# Patient Record
Sex: MALE | Race: BLACK OR AFRICAN AMERICAN | NOT HISPANIC OR LATINO | Employment: FULL TIME | URBAN - METROPOLITAN AREA
[De-identification: names, ages, dates, MRNs, and addresses within clinical notes are randomized per-mention and may not be internally consistent; named-entity substitution may affect disease eponyms.]

---
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
## 2019-04-05 ENCOUNTER — APPOINTMENT (OUTPATIENT)
Dept: LAB | Facility: HOSPITAL | Age: 19
End: 2019-04-05
Attending: UROLOGY
Payer: COMMERCIAL

## 2019-04-05 ENCOUNTER — OFFICE VISIT (OUTPATIENT)
Dept: UROLOGY | Facility: CLINIC | Age: 19
End: 2019-04-05
Payer: COMMERCIAL

## 2019-04-05 VITALS
BODY MASS INDEX: 19.89 KG/M2 | DIASTOLIC BLOOD PRESSURE: 76 MMHG | HEIGHT: 67 IN | TEMPERATURE: 98 F | WEIGHT: 126.75 LBS | SYSTOLIC BLOOD PRESSURE: 115 MMHG | HEART RATE: 79 BPM | RESPIRATION RATE: 18 BRPM

## 2019-04-05 DIAGNOSIS — R30.0 DYSURIA: ICD-10-CM

## 2019-04-05 DIAGNOSIS — N34.2 URETHRITIS: ICD-10-CM

## 2019-04-05 DIAGNOSIS — N41.9 PROSTATITIS, UNSPECIFIED PROSTATITIS TYPE: ICD-10-CM

## 2019-04-05 DIAGNOSIS — R31.29 MICROSCOPIC HEMATURIA: Primary | ICD-10-CM

## 2019-04-05 LAB
BILIRUB SERPL-MCNC: ABNORMAL MG/DL
BLOOD URINE, POC: ABNORMAL
COLOR, POC UA: YELLOW
GLUCOSE UR QL STRIP: ABNORMAL
KETONES UR QL STRIP: ABNORMAL
LEUKOCYTE ESTERASE URINE, POC: ABNORMAL
NITRITE, POC UA: ABNORMAL
PH, POC UA: 6.5
PROTEIN, POC: 300
SPECIFIC GRAVITY, POC UA: 1.02
UROBILINOGEN, POC UA: ABNORMAL

## 2019-04-05 PROCEDURE — 99204 OFFICE O/P NEW MOD 45 MIN: CPT | Mod: 25,S$GLB,, | Performed by: UROLOGY

## 2019-04-05 PROCEDURE — 81002 POCT URINE DIPSTICK WITHOUT MICROSCOPE: ICD-10-PCS | Mod: S$GLB,,, | Performed by: UROLOGY

## 2019-04-05 PROCEDURE — 88112 CYTOPATH CELL ENHANCE TECH: CPT | Performed by: PATHOLOGY

## 2019-04-05 PROCEDURE — 87086 URINE CULTURE/COLONY COUNT: CPT

## 2019-04-05 PROCEDURE — 99204 PR OFFICE/OUTPT VISIT, NEW, LEVL IV, 45-59 MIN: ICD-10-PCS | Mod: 25,S$GLB,, | Performed by: UROLOGY

## 2019-04-05 PROCEDURE — 88112 CYTOPATH CELL ENHANCE TECH: CPT | Mod: 26,,, | Performed by: PATHOLOGY

## 2019-04-05 PROCEDURE — 99999 PR PBB SHADOW E&M-EST. PATIENT-LVL III: ICD-10-PCS | Mod: PBBFAC,,, | Performed by: UROLOGY

## 2019-04-05 PROCEDURE — 87088 URINE BACTERIA CULTURE: CPT

## 2019-04-05 PROCEDURE — 88112 CYTOLOGY SPECIMEN-URINE: ICD-10-PCS | Mod: 26,,, | Performed by: PATHOLOGY

## 2019-04-05 PROCEDURE — 99999 PR PBB SHADOW E&M-EST. PATIENT-LVL III: CPT | Mod: PBBFAC,,, | Performed by: UROLOGY

## 2019-04-05 PROCEDURE — 81002 URINALYSIS NONAUTO W/O SCOPE: CPT | Mod: S$GLB,,, | Performed by: UROLOGY

## 2019-04-05 RX ORDER — DOXYCYCLINE 100 MG/1
100 CAPSULE ORAL 2 TIMES DAILY
Qty: 20 CAPSULE | Refills: 0 | Status: SHIPPED | OUTPATIENT
Start: 2019-04-05 | End: 2019-06-27 | Stop reason: ALTCHOICE

## 2019-04-05 RX ORDER — PHENAZOPYRIDINE HYDROCHLORIDE 200 MG/1
200 TABLET, FILM COATED ORAL EVERY 6 HOURS PRN
Qty: 20 TABLET | Refills: 0 | Status: SHIPPED | OUTPATIENT
Start: 2019-04-05 | End: 2019-06-27 | Stop reason: ALTCHOICE

## 2019-04-05 NOTE — PROGRESS NOTES
OFFICE NOTE H AND P NEW PATIENT    AGE:  18.    DRUG ALLERGIES:  NKDA.    CHIEF COMPLAINT:  Dysuria, hematuria, painful erection, bloody drainage from the   penis.    HISTORY OF PRESENT ILLNESS:  This 18-year-old male presents with approximately   one-month history of experiencing intermittent episodes of the above-mentioned   symptoms, for which he is yet to undergo any evaluation or treatment.  He denies   any recent sexual encounters.  Denies any other  history.    PAST MEDICAL HISTORY:  Asthma, ADHD.    PAST SURGICAL HISTORY:  Tonsillectomy and tympanostomy.    PRESENT MEDICATIONS:  He is not on any medication at this point for any of the   above-mentioned conditions.    FAMILY HISTORY:  Negative.    SOCIAL HISTORY:  He cooks in a fast-food restaurant.  Single, no children.    Tobacco, none.  Alcohol, none.    REVIEW OF SYMPTOMS:  GENERAL:  No weight change.  Good appetite.  HEAD AND NECK:  No headaches.  No visual problems.  CARDIORESPIRATORY:  Denies chest pain, shortness of breath or cough.  GASTROINTESTINAL:  No trouble swallowing, constipation or diarrhea.  MUSCULOSKELETAL:  No joint or back problems.  HEMATOLOGIC:  No bleeding from any of the sites.    PHYSICAL EXAMINATION:  VITAL SIGNS:  BP of 115/76, pulse 79, temperature 98, respirations 18, weight   57.5 kg, height 5 feet 7 inches.  GENERAL:  Well-developed, well-nourished 18-year-old male, alert, oriented,   cooperative, in no acute distress.  HEAD AND NECK:  Throat clear.  No adenopathies.  CHEST:  Clear.  HEART:  Regular.  No murmur.  ABDOMEN:  Soft, benign and nontender.  No masses.  No hernias, no organomegaly.  EXTERNAL GENITAL:  Normal phallus with adequate meatus.  No evidence of any   blood at the meatus.  No skin lesions.  No Peyronie plaques.  Testes descended   and feel normal.  No scrotal masses.  RECTAL:  A 15 to 20 g smooth prostate.  No nodule.  Normal sphincter tone.    UA did reveal trace of blood, also moderate number of wbc's, pH  6.5.    FINAL IMPRESSION:  Low urinary tract symptoms consistent with possible   urethritis and/or prostatitis and urinary tract infection, microscopic   hematuria, pyuria.    RECOMMENDATIONS:  Urine for C and S and cytology.  Trial on doxycycline 100 mg   p.o. b.i.d. which the patient was given a prescription, Pyridium 200 mg q. 6   hours p.r.n. for the dysuria and we will contact him with urine test results and   notify him of his next appointment.      MD/TAJ  dd: 04/05/2019 13:46:22 (CDT)  td: 04/06/2019 00:54:25 (CDT)  Doc ID   #[id number]  Job ID #[id number]    CC:

## 2019-04-07 LAB — BACTERIA UR CULT: NORMAL

## 2019-06-27 ENCOUNTER — OFFICE VISIT (OUTPATIENT)
Dept: UROLOGY | Facility: CLINIC | Age: 19
End: 2019-06-27
Payer: COMMERCIAL

## 2019-06-27 VITALS
BODY MASS INDEX: 19.66 KG/M2 | HEIGHT: 67 IN | WEIGHT: 125.25 LBS | TEMPERATURE: 99 F | DIASTOLIC BLOOD PRESSURE: 80 MMHG | RESPIRATION RATE: 18 BRPM | SYSTOLIC BLOOD PRESSURE: 132 MMHG | HEART RATE: 63 BPM

## 2019-06-27 DIAGNOSIS — R31.29 MICROSCOPIC HEMATURIA: Primary | ICD-10-CM

## 2019-06-27 DIAGNOSIS — R30.0 DYSURIA: ICD-10-CM

## 2019-06-27 PROCEDURE — 99214 PR OFFICE/OUTPT VISIT, EST, LEVL IV, 30-39 MIN: ICD-10-PCS | Mod: 25,S$GLB,, | Performed by: UROLOGY

## 2019-06-27 PROCEDURE — 99999 PR PBB SHADOW E&M-EST. PATIENT-LVL IV: ICD-10-PCS | Mod: PBBFAC,,, | Performed by: UROLOGY

## 2019-06-27 PROCEDURE — 99214 OFFICE O/P EST MOD 30 MIN: CPT | Mod: 25,S$GLB,, | Performed by: UROLOGY

## 2019-06-27 PROCEDURE — 99999 PR PBB SHADOW E&M-EST. PATIENT-LVL IV: CPT | Mod: PBBFAC,,, | Performed by: UROLOGY

## 2019-06-27 PROCEDURE — 87086 URINE CULTURE/COLONY COUNT: CPT

## 2019-06-27 PROCEDURE — 81000 CHG URINALYSIS, NONAUTO, W/SCOPE: ICD-10-PCS | Mod: S$GLB,,, | Performed by: UROLOGY

## 2019-06-27 PROCEDURE — 81000 URINALYSIS NONAUTO W/SCOPE: CPT | Mod: S$GLB,,, | Performed by: UROLOGY

## 2019-06-27 RX ORDER — SULFAMETHOXAZOLE AND TRIMETHOPRIM 800; 160 MG/1; MG/1
1 TABLET ORAL 2 TIMES DAILY
Qty: 20 TABLET | Refills: 0 | Status: ON HOLD | OUTPATIENT
Start: 2019-06-27 | End: 2019-08-05 | Stop reason: ALTCHOICE

## 2019-06-27 NOTE — PROGRESS NOTES
OFFICE NOTE    CHIEF COMPLAINT:  Recurrent lower urinary tract symptoms with dysuria and UTI.    HISTORY OF PRESENT ILLNESS:  This 18-year-old male returns for routine recheck.    At his last visit on 04/05/2019, he had presented with approximately one-month   history of intermittent episodes of hematuria and dysuria and at that time, he   was placed on doxycycline 100 mg p.o. b.i.d., which resolved the problem, but   over the past week he says the dysuria and the symptoms have recurred again.  He   was found at the last visit to have urine infection growing Staph for which he   was treated with doxycycline.  Currently, he is not on any antibiotics and   denies any fever, chills, no any other complaints.    PHYSICAL EXAMINATION:  GENITAL:  Essentially unremarkable.    UA again revealed evidence of microscopic hematuria and pyuria with pH 5.5.    FINAL IMPRESSION:  Recurrent lower tract symptoms, recurrent microscopic   hematuria and pyuria.    RECOMMENDATIONS:  Urine for C and S and we will treat with appropriate   antibiotics and it also has been noted that urine cytology at his last visit was   negative.  We will treat with appropriate antibiotics and also plan for CT scan   of the abdomen and pelvis.  Possibility of cystoscopy may need to be considered   if the problem continues and recurs.      KARLA  dd: 06/27/2019 11:27:18 (CDT)  td: 06/28/2019 01:56:46 (CDT)  Doc ID   #[id number]  Job ID #[id number]    CC:

## 2019-06-29 LAB
BACTERIA UR CULT: NORMAL
BACTERIA UR CULT: NORMAL

## 2019-07-23 ENCOUNTER — PATIENT MESSAGE (OUTPATIENT)
Dept: UROLOGY | Facility: CLINIC | Age: 19
End: 2019-07-23

## 2019-07-26 ENCOUNTER — HOSPITAL ENCOUNTER (OUTPATIENT)
Dept: RADIOLOGY | Facility: HOSPITAL | Age: 19
Discharge: HOME | End: 2019-07-26
Attending: UROLOGY
Payer: COMMERCIAL

## 2019-07-26 DIAGNOSIS — R31.29 MICROSCOPIC HEMATURIA: ICD-10-CM

## 2019-07-26 PROCEDURE — 74178 CT ABDOMEN PELVIS W WO CONTRAST: ICD-10-PCS | Mod: 26,,, | Performed by: RADIOLOGY

## 2019-07-26 PROCEDURE — 74178 CT ABD&PLV WO CNTR FLWD CNTR: CPT | Mod: TC

## 2019-07-26 PROCEDURE — 74178 CT ABD&PLV WO CNTR FLWD CNTR: CPT | Mod: 26,,, | Performed by: RADIOLOGY

## 2019-07-26 PROCEDURE — 25500020 PHARM REV CODE 255: Performed by: UROLOGY

## 2019-07-26 RX ADMIN — IOHEXOL 125 ML: 350 INJECTION, SOLUTION INTRAVENOUS at 11:07

## 2019-07-28 ENCOUNTER — PATIENT MESSAGE (OUTPATIENT)
Dept: UROLOGY | Facility: CLINIC | Age: 19
End: 2019-07-28

## 2019-07-29 ENCOUNTER — TELEPHONE (OUTPATIENT)
Dept: UROLOGY | Facility: CLINIC | Age: 19
End: 2019-07-29

## 2019-07-29 ENCOUNTER — PATIENT MESSAGE (OUTPATIENT)
Dept: UROLOGY | Facility: CLINIC | Age: 19
End: 2019-07-29

## 2019-07-30 ENCOUNTER — PATIENT MESSAGE (OUTPATIENT)
Dept: UROLOGY | Facility: CLINIC | Age: 19
End: 2019-07-30

## 2019-07-31 DIAGNOSIS — R31.0 GROSS HEMATURIA: Primary | ICD-10-CM

## 2019-07-31 DIAGNOSIS — R31.9 HEMATURIA: ICD-10-CM

## 2019-07-31 DIAGNOSIS — R39.9 LOWER URINARY TRACT SYMPTOMS (LUTS): ICD-10-CM

## 2019-07-31 NOTE — H&P
Subjective:       Patient ID: Michele Perez is a 18 y.o. male.    Chief Complaint:  Recurrent hematuria and dysuria.  Urine culture cytology negative.  CT scan reveals  increased blood flow around the prostate otherwise no other significant findings.    Past Medical History:   Diagnosis Date    ADHD (attention deficit hyperactivity disorder)     no current medications    Allergy     seasonal rhinitis    Asthma     primarily exercise-induced asthma    Learning disability     speech related     Past Surgical History:   Procedure Laterality Date    ADENOIDECTOMY  2002    TONSILLECTOMY  2002    TYMPANOSTOMY TUBE PLACEMENT  2002     No family history on file.  Social History     Socioeconomic History    Marital status: Single     Spouse name: Not on file    Number of children: Not on file    Years of education: Not on file    Highest education level: Not on file   Occupational History    Not on file   Social Needs    Financial resource strain: Not on file    Food insecurity:     Worry: Not on file     Inability: Not on file    Transportation needs:     Medical: Not on file     Non-medical: Not on file   Tobacco Use    Smoking status: Never Smoker    Smokeless tobacco: Never Used   Substance and Sexual Activity    Alcohol use: No    Drug use: No    Sexual activity: Never   Lifestyle    Physical activity:     Days per week: Not on file     Minutes per session: Not on file    Stress: Not on file   Relationships    Social connections:     Talks on phone: Not on file     Gets together: Not on file     Attends Sabianism service: Not on file     Active member of club or organization: Not on file     Attends meetings of clubs or organizations: Not on file     Relationship status: Not on file   Other Topics Concern    Not on file   Social History Narrative    PMH:ADHD inattentive type, ODD and LD with speech impairment in 2005, not taking any medications now; T&A with PET in 2002; AR with  occasional sinusitis last in 2/10, rare asthma last attacks 12/09, 4/09 and 1/08, worse with sports    SH:intact family, 3 siblings, no smokers or pets    FH:RAD, AR, DM in [facility], MGGF had AMI death, MGM has HTN; PGM, cousins and PA have SCT, MGA had breast cancer    SCHOOL/ACTIVITIES:6th grade good student, active in basketball and baseball    LAB:Normal lipids in 2009 and UA in 2007; Hgb 16.4 in 8/11                   Current Outpatient Medications   Medication Sig Dispense Refill    sulfamethoxazole-trimethoprim 800-160mg (BACTRIM DS) 800-160 mg Tab Take 1 tablet by mouth 2 (two) times daily. 20 tablet 0     No current facility-administered medications for this visit.      Review of patient's allergies indicates:  No Known Allergies    Review of Systems   All other systems reviewed and are negative.      Objective:      There were no vitals filed for this visit.  Physical Exam   Cardiovascular: Normal rate.   Pulmonary/Chest: Effort normal.   Abdominal: Soft.   Genitourinary: Prostate normal and penis normal.       Assessment:       1. Gross hematuria    2. Lower urinary tract symptoms (LUTS)    3. Hematuria        Plan:       Cystoscopy

## 2019-07-31 NOTE — H&P (VIEW-ONLY)
Subjective:       Patient ID: Michele Perez is a 18 y.o. male.    Chief Complaint:  Recurrent hematuria and dysuria.  Urine culture cytology negative.  CT scan reveals  increased blood flow around the prostate otherwise no other significant findings.    Past Medical History:   Diagnosis Date    ADHD (attention deficit hyperactivity disorder)     no current medications    Allergy     seasonal rhinitis    Asthma     primarily exercise-induced asthma    Learning disability     speech related     Past Surgical History:   Procedure Laterality Date    ADENOIDECTOMY  2002    TONSILLECTOMY  2002    TYMPANOSTOMY TUBE PLACEMENT  2002     No family history on file.  Social History     Socioeconomic History    Marital status: Single     Spouse name: Not on file    Number of children: Not on file    Years of education: Not on file    Highest education level: Not on file   Occupational History    Not on file   Social Needs    Financial resource strain: Not on file    Food insecurity:     Worry: Not on file     Inability: Not on file    Transportation needs:     Medical: Not on file     Non-medical: Not on file   Tobacco Use    Smoking status: Never Smoker    Smokeless tobacco: Never Used   Substance and Sexual Activity    Alcohol use: No    Drug use: No    Sexual activity: Never   Lifestyle    Physical activity:     Days per week: Not on file     Minutes per session: Not on file    Stress: Not on file   Relationships    Social connections:     Talks on phone: Not on file     Gets together: Not on file     Attends Mosque service: Not on file     Active member of club or organization: Not on file     Attends meetings of clubs or organizations: Not on file     Relationship status: Not on file   Other Topics Concern    Not on file   Social History Narrative    PMH:ADHD inattentive type, ODD and LD with speech impairment in 2005, not taking any medications now; T&A with PET in 2002; AR with  occasional sinusitis last in 2/10, rare asthma last attacks 12/09, 4/09 and 1/08, worse with sports    SH:intact family, 3 siblings, no smokers or pets    FH:RAD, AR, DM in [facility], MGGF had AMI death, MGM has HTN; PGM, cousins and PA have SCT, MGA had breast cancer    SCHOOL/ACTIVITIES:6th grade good student, active in basketball and baseball    LAB:Normal lipids in 2009 and UA in 2007; Hgb 16.4 in 8/11                   Current Outpatient Medications   Medication Sig Dispense Refill    sulfamethoxazole-trimethoprim 800-160mg (BACTRIM DS) 800-160 mg Tab Take 1 tablet by mouth 2 (two) times daily. 20 tablet 0     No current facility-administered medications for this visit.      Review of patient's allergies indicates:  No Known Allergies    Review of Systems   All other systems reviewed and are negative.      Objective:      There were no vitals filed for this visit.  Physical Exam   Cardiovascular: Normal rate.   Pulmonary/Chest: Effort normal.   Abdominal: Soft.   Genitourinary: Prostate normal and penis normal.       Assessment:       1. Gross hematuria    2. Lower urinary tract symptoms (LUTS)    3. Hematuria        Plan:       Cystoscopy

## 2019-08-01 ENCOUNTER — HOSPITAL ENCOUNTER (OUTPATIENT)
Dept: PREADMISSION TESTING | Facility: HOSPITAL | Age: 19
Discharge: HOME | End: 2019-08-01
Attending: UROLOGY
Payer: COMMERCIAL

## 2019-08-01 VITALS — BODY MASS INDEX: 20.39 KG/M2 | WEIGHT: 129.94 LBS | HEIGHT: 67 IN

## 2019-08-01 PROCEDURE — 99900103 DSU ONLY-NO CHARGE-INITIAL HR (STAT)

## 2019-08-01 PROCEDURE — 99900104 DSU ONLY-NO CHARGE-EA ADD'L HR (STAT)

## 2019-08-01 NOTE — DISCHARGE INSTRUCTIONS

## 2019-08-03 ENCOUNTER — ANESTHESIA EVENT (OUTPATIENT)
Dept: SURGERY | Facility: HOSPITAL | Age: 19
End: 2019-08-03
Payer: COMMERCIAL

## 2019-08-05 ENCOUNTER — ANESTHESIA (OUTPATIENT)
Dept: SURGERY | Facility: HOSPITAL | Age: 19
End: 2019-08-05
Payer: COMMERCIAL

## 2019-08-05 ENCOUNTER — HOSPITAL ENCOUNTER (OUTPATIENT)
Facility: HOSPITAL | Age: 19
Discharge: HOME | End: 2019-08-05
Attending: UROLOGY | Admitting: UROLOGY
Payer: COMMERCIAL

## 2019-08-05 VITALS
WEIGHT: 129 LBS | OXYGEN SATURATION: 100 % | SYSTOLIC BLOOD PRESSURE: 130 MMHG | RESPIRATION RATE: 16 BRPM | BODY MASS INDEX: 20.25 KG/M2 | HEIGHT: 67 IN | DIASTOLIC BLOOD PRESSURE: 82 MMHG | HEART RATE: 77 BPM | TEMPERATURE: 98 F

## 2019-08-05 DIAGNOSIS — R31.9 HEMATURIA: ICD-10-CM

## 2019-08-05 DIAGNOSIS — R31.0 GROSS HEMATURIA: ICD-10-CM

## 2019-08-05 DIAGNOSIS — R39.9 LOWER URINARY TRACT SYMPTOMS (LUTS): ICD-10-CM

## 2019-08-05 PROCEDURE — 88342 IMHCHEM/IMCYTCHM 1ST ANTB: CPT | Mod: 26,,, | Performed by: PATHOLOGY

## 2019-08-05 PROCEDURE — 36000707: Performed by: UROLOGY

## 2019-08-05 PROCEDURE — 88305 TISSUE SPECIMEN TO PATHOLOGY - SURGERY: ICD-10-PCS | Mod: 26,,, | Performed by: PATHOLOGY

## 2019-08-05 PROCEDURE — 88305 TISSUE EXAM BY PATHOLOGIST: CPT | Mod: 26,,, | Performed by: PATHOLOGY

## 2019-08-05 PROCEDURE — D9220A PRA ANESTHESIA: ICD-10-PCS | Mod: CRNA,,, | Performed by: NURSE ANESTHETIST, CERTIFIED REGISTERED

## 2019-08-05 PROCEDURE — 52260 PR CYSTOSCOPY,DIL BLADDER,GEN ANESTH: ICD-10-PCS | Mod: ,,, | Performed by: UROLOGY

## 2019-08-05 PROCEDURE — 71000039 HC RECOVERY, EACH ADD'L HOUR: Performed by: UROLOGY

## 2019-08-05 PROCEDURE — 63600175 PHARM REV CODE 636 W HCPCS: Performed by: UROLOGY

## 2019-08-05 PROCEDURE — 25000003 PHARM REV CODE 250: Performed by: ANESTHESIOLOGY

## 2019-08-05 PROCEDURE — 71000033 HC RECOVERY, INTIAL HOUR: Performed by: UROLOGY

## 2019-08-05 PROCEDURE — 63600175 PHARM REV CODE 636 W HCPCS: Performed by: NURSE ANESTHETIST, CERTIFIED REGISTERED

## 2019-08-05 PROCEDURE — 37000008 HC ANESTHESIA 1ST 15 MINUTES: Performed by: UROLOGY

## 2019-08-05 PROCEDURE — 63600175 PHARM REV CODE 636 W HCPCS: Performed by: ANESTHESIOLOGY

## 2019-08-05 PROCEDURE — D9220A PRA ANESTHESIA: ICD-10-PCS | Mod: ANES,,, | Performed by: ANESTHESIOLOGY

## 2019-08-05 PROCEDURE — 37000009 HC ANESTHESIA EA ADD 15 MINS: Performed by: UROLOGY

## 2019-08-05 PROCEDURE — 99900103 DSU ONLY-NO CHARGE-INITIAL HR (STAT): Performed by: UROLOGY

## 2019-08-05 PROCEDURE — 99900104 DSU ONLY-NO CHARGE-EA ADD'L HR (STAT): Performed by: UROLOGY

## 2019-08-05 PROCEDURE — 71000015 HC POSTOP RECOV 1ST HR: Performed by: UROLOGY

## 2019-08-05 PROCEDURE — 88342 TISSUE SPECIMEN TO PATHOLOGY - SURGERY: ICD-10-PCS | Mod: 26,,, | Performed by: PATHOLOGY

## 2019-08-05 PROCEDURE — D9220A PRA ANESTHESIA: Mod: ANES,,, | Performed by: ANESTHESIOLOGY

## 2019-08-05 PROCEDURE — 36000706: Performed by: UROLOGY

## 2019-08-05 PROCEDURE — 52260 CYSTOSCOPY AND TREATMENT: CPT | Mod: ,,, | Performed by: UROLOGY

## 2019-08-05 PROCEDURE — 88305 TISSUE EXAM BY PATHOLOGIST: CPT | Performed by: PATHOLOGY

## 2019-08-05 PROCEDURE — D9220A PRA ANESTHESIA: Mod: CRNA,,, | Performed by: NURSE ANESTHETIST, CERTIFIED REGISTERED

## 2019-08-05 PROCEDURE — 25000003 PHARM REV CODE 250: Performed by: UROLOGY

## 2019-08-05 RX ORDER — PHENAZOPYRIDINE HYDROCHLORIDE 100 MG/1
200 TABLET, FILM COATED ORAL ONCE
Status: COMPLETED | OUTPATIENT
Start: 2019-08-05 | End: 2019-08-05

## 2019-08-05 RX ORDER — LIDOCAINE HYDROCHLORIDE 10 MG/ML
1 INJECTION, SOLUTION EPIDURAL; INFILTRATION; INTRACAUDAL; PERINEURAL ONCE
Status: DISPENSED | OUTPATIENT
Start: 2019-08-05

## 2019-08-05 RX ORDER — ONDANSETRON HYDROCHLORIDE 2 MG/ML
INJECTION, SOLUTION INTRAMUSCULAR; INTRAVENOUS
Status: DISCONTINUED | OUTPATIENT
Start: 2019-08-05 | End: 2019-08-05

## 2019-08-05 RX ORDER — MIDAZOLAM HYDROCHLORIDE 1 MG/ML
INJECTION INTRAMUSCULAR; INTRAVENOUS
Status: DISCONTINUED | OUTPATIENT
Start: 2019-08-05 | End: 2019-08-05

## 2019-08-05 RX ORDER — HYDROCODONE BITARTRATE AND ACETAMINOPHEN 5; 325 MG/1; MG/1
1 TABLET ORAL EVERY 4 HOURS PRN
Status: DISCONTINUED | OUTPATIENT
Start: 2019-08-05 | End: 2019-08-05 | Stop reason: HOSPADM

## 2019-08-05 RX ORDER — SODIUM CHLORIDE, SODIUM LACTATE, POTASSIUM CHLORIDE, CALCIUM CHLORIDE 600; 310; 30; 20 MG/100ML; MG/100ML; MG/100ML; MG/100ML
INJECTION, SOLUTION INTRAVENOUS CONTINUOUS
Status: ACTIVE | OUTPATIENT
Start: 2019-08-05

## 2019-08-05 RX ORDER — FENTANYL CITRATE 50 UG/ML
INJECTION, SOLUTION INTRAMUSCULAR; INTRAVENOUS
Status: DISCONTINUED | OUTPATIENT
Start: 2019-08-05 | End: 2019-08-05

## 2019-08-05 RX ORDER — LIDOCAINE HYDROCHLORIDE 20 MG/ML
JELLY TOPICAL
Status: DISCONTINUED | OUTPATIENT
Start: 2019-08-05 | End: 2019-08-05 | Stop reason: HOSPADM

## 2019-08-05 RX ORDER — OXYCODONE HYDROCHLORIDE 5 MG/1
5 TABLET ORAL
Status: DISCONTINUED | OUTPATIENT
Start: 2019-08-05 | End: 2019-08-05 | Stop reason: HOSPADM

## 2019-08-05 RX ORDER — CEFAZOLIN SODIUM 2 G/50ML
2 SOLUTION INTRAVENOUS
Status: COMPLETED | OUTPATIENT
Start: 2019-08-05 | End: 2019-08-05

## 2019-08-05 RX ORDER — PROPOFOL 10 MG/ML
VIAL (ML) INTRAVENOUS
Status: DISCONTINUED | OUTPATIENT
Start: 2019-08-05 | End: 2019-08-05

## 2019-08-05 RX ORDER — LIDOCAINE HCL/PF 100 MG/5ML
SYRINGE (ML) INTRAVENOUS
Status: DISCONTINUED | OUTPATIENT
Start: 2019-08-05 | End: 2019-08-05

## 2019-08-05 RX ORDER — HYDROCODONE BITARTRATE AND ACETAMINOPHEN 5; 325 MG/1; MG/1
1 TABLET ORAL
Qty: 20 TABLET | Refills: 0 | Status: SHIPPED | OUTPATIENT
Start: 2019-08-05 | End: 2022-10-24

## 2019-08-05 RX ORDER — PHENAZOPYRIDINE HYDROCHLORIDE 100 MG/1
200 TABLET, FILM COATED ORAL
Qty: 20 TABLET | Refills: 0 | Status: SHIPPED | OUTPATIENT
Start: 2019-08-05 | End: 2022-10-24

## 2019-08-05 RX ORDER — SULFAMETHOXAZOLE AND TRIMETHOPRIM 800; 160 MG/1; MG/1
1 TABLET ORAL 2 TIMES DAILY
Qty: 20 TABLET | Refills: 0 | Status: SHIPPED | OUTPATIENT
Start: 2019-08-05 | End: 2019-08-15

## 2019-08-05 RX ORDER — HYDROMORPHONE HYDROCHLORIDE 2 MG/ML
0.2 INJECTION, SOLUTION INTRAMUSCULAR; INTRAVENOUS; SUBCUTANEOUS EVERY 5 MIN PRN
Status: DISCONTINUED | OUTPATIENT
Start: 2019-08-05 | End: 2019-08-05 | Stop reason: HOSPADM

## 2019-08-05 RX ORDER — FENTANYL CITRATE 50 UG/ML
25 INJECTION, SOLUTION INTRAMUSCULAR; INTRAVENOUS EVERY 5 MIN PRN
Status: DISCONTINUED | OUTPATIENT
Start: 2019-08-05 | End: 2019-08-05 | Stop reason: HOSPADM

## 2019-08-05 RX ORDER — DEXAMETHASONE SODIUM PHOSPHATE 4 MG/ML
INJECTION, SOLUTION INTRA-ARTICULAR; INTRALESIONAL; INTRAMUSCULAR; INTRAVENOUS; SOFT TISSUE
Status: DISCONTINUED | OUTPATIENT
Start: 2019-08-05 | End: 2019-08-05

## 2019-08-05 RX ADMIN — OXYCODONE HYDROCHLORIDE 5 MG: 5 TABLET ORAL at 03:08

## 2019-08-05 RX ADMIN — DEXAMETHASONE SODIUM PHOSPHATE 4 MG: 4 INJECTION, SOLUTION INTRAMUSCULAR; INTRAVENOUS at 02:08

## 2019-08-05 RX ADMIN — CEFAZOLIN SODIUM 2 G: 2 SOLUTION INTRAVENOUS at 02:08

## 2019-08-05 RX ADMIN — FENTANYL CITRATE 50 MCG: 50 INJECTION, SOLUTION INTRAMUSCULAR; INTRAVENOUS at 02:08

## 2019-08-05 RX ADMIN — SODIUM CHLORIDE, SODIUM LACTATE, POTASSIUM CHLORIDE, AND CALCIUM CHLORIDE: .6; .31; .03; .02 INJECTION, SOLUTION INTRAVENOUS at 02:08

## 2019-08-05 RX ADMIN — PHENAZOPYRIDINE HYDROCHLORIDE 200 MG: 100 TABLET ORAL at 03:08

## 2019-08-05 RX ADMIN — MIDAZOLAM HYDROCHLORIDE 2 MG: 1 INJECTION, SOLUTION INTRAMUSCULAR; INTRAVENOUS at 02:08

## 2019-08-05 RX ADMIN — ONDANSETRON 4 MG: 2 INJECTION, SOLUTION INTRAMUSCULAR; INTRAVENOUS at 02:08

## 2019-08-05 RX ADMIN — SODIUM CHLORIDE, SODIUM LACTATE, POTASSIUM CHLORIDE, AND CALCIUM CHLORIDE: .6; .31; .03; .02 INJECTION, SOLUTION INTRAVENOUS at 12:08

## 2019-08-05 RX ADMIN — PROPOFOL 200 MG: 10 INJECTION, EMULSION INTRAVENOUS at 02:08

## 2019-08-05 RX ADMIN — FENTANYL CITRATE 25 MCG: 50 INJECTION, SOLUTION INTRAMUSCULAR; INTRAVENOUS at 02:08

## 2019-08-05 RX ADMIN — LIDOCAINE HYDROCHLORIDE 75 MG: 20 INJECTION, SOLUTION INTRAVENOUS at 02:08

## 2019-08-05 NOTE — INTERVAL H&P NOTE
The patient has been examined and the H&P has been reviewed:    I concur with the findings and no changes have occurred since H&P was written.    Anesthesia/Surgery risks, benefits and alternative options discussed and understood by patient/family.          Active Hospital Problems    Diagnosis  POA    Hematuria [R31.9]  Yes      Resolved Hospital Problems   No resolved problems to display.

## 2019-08-05 NOTE — TRANSFER OF CARE
"Anesthesia Transfer of Care Note    Patient: Michele Perez    Procedure(s) Performed: Procedure(s) (LRB):  CYSTOSCOPY (N/A)  HYDRODISTENTION (N/A)  CYSTOSCOPY, WITH BLADDER BIOPSY, WITH FULGURATION IF INDICATED (N/A)    Patient location: PACU    Anesthesia Type: general    Transport from OR: Transported from OR on 2-3 L/min O2 by NC with adequate spontaneous ventilation    Post pain: adequate analgesia    Post assessment: no apparent anesthetic complications    Post vital signs: stable    Level of consciousness: sedated    Nausea/Vomiting: no nausea/vomiting    Complications: none    Transfer of care protocol was followed      Last vitals:   Visit Vitals  /61 (BP Location: Left arm, Patient Position: Sitting)   Pulse 73   Temp 37.2 °C (99 °F) (Skin)   Resp 18   Ht 5' 7" (1.702 m)   Wt 58.5 kg (129 lb)   SpO2 99%   BMI 20.20 kg/m²     "

## 2019-08-05 NOTE — ANESTHESIA PREPROCEDURE EVALUATION
08/05/2019  Michele Perez is a 18 y.o., male.    Anesthesia Evaluation    I have reviewed the Patient Summary Reports.    I have reviewed the Nursing Notes.   I have reviewed the Medications.     Review of Systems  Renal/:  Renal Symptoms/Infections/Stones: hematuria.      Gross hematuria (R31.0)      Lower urinary tract symptoms (LUTS) (R39.9)       Physical Exam  General:  Well nourished    Airway/Jaw/Neck:  Airway Findings: Mouth Opening: Normal Tongue: Normal  General Airway Assessment: Adult, Good  Mallampati: II  Improves to II with phonation.  TM Distance: 4-6 cm      Dental:  Dental Findings: In tact   Chest/Lungs:  Chest/Lungs Findings: Clear to auscultation, Normal Respiratory Rate     Heart/Vascular:  Heart Findings: Rate: Normal  Rhythm: Regular Rhythm  Sounds: Normal  Heart murmur: negative       Mental Status:  Mental Status Findings:  Cooperative, Alert and Oriented         Anesthesia Plan  Type of Anesthesia, risks & benefits discussed:  Anesthesia Type:  general  Patient's Preference:   Intra-op Monitoring Plan: standard ASA monitors  Intra-op Monitoring Plan Comments:   Post Op Pain Control Plan:   Post Op Pain Control Plan Comments:   Induction:   IV  Beta Blocker:  Patient is not currently on a Beta-Blocker (No further documentation required).       Informed Consent: Patient understands risks and agrees with Anesthesia plan.  Questions answered. Anesthesia consent signed with patient.  ASA Score: 1     Day of Surgery Review of History & Physical: I have interviewed and examined the patient. I have reviewed the patient's H&P dated:  There are no significant changes.  H&P update referred to the surgeon.         Ready For Surgery From Anesthesia Perspective.

## 2019-08-05 NOTE — OP NOTE
DATE OF PROCEDURE:  08/05/2019.    PREOPERATIVE DIAGNOSES:  Recurrent urinary tract infection and lower urinary   tract symptoms.    POSTOPERATIVE DIAGNOSES:  Recurrent urinary tract infection and lower urinary   tract symptoms with interstitial cystitis, pathology pending and hemorrhagic   bladder mucosa.    PROCEDURES PERFORMED:  Cystourethroscopy, bladder hydrodistention, biopsy and   fulguration of bladder lesions.    SURGEON:  Earl Gustafson M.D.    ANESTHESIA:  General.    PROCEDURE IN DETAIL:  The patient was brought to Cystoscopy Suite and after   adequate induction of general anesthesia, he was placed in lithotomy position.    Genitalia were prepped and draped in usual manner.  A 22 Irish cystoscope   sheath with a foroblique lens video camera was inserted under direct vision into   urethra.  Examination of the anterior urethra was unremarkable.  The instrument   was advanced towards the prostatic urethra where the verumontanum was   encountered and the prostatic urethra was wide open with no evidence of any   obstruction, no lesions.  The interior of the bladder was then examined.  The   trigone was symmetrically configurated.  Both orifices were slit-like, had clear   efflux.  Bladder mucosa was essentially smooth throughout, no evidence of   trabeculation or diverticula, no cellules, no lesions, no hemorrhagic sites, but   bladder was then distended and able to tolerate initially capacity of   approximately 500-600 mL.  It was then drained and reexamined and there were   multiple petechial hemorrhagic glomerulizations scattered throughout the bladder   mucosa that appeared to be consistent with interstitial cystitis and   hemorrhagic cystitis.  Cold cup biopsies were obtained from representative areas   on each side.  A repeat bladder hydrodistention was carried out just before   this and then an additional 700 mL up to 738 mL introduced into the bladder for   further bladder hydrodistention and then  the biopsies were taken as described   above.  Biopsy sites were then fulgurated with Bugbee electrode.  Bladder was   then drained, instruments removed.  Digital rectal examination reveals normal   smooth prostate and no evidence of any masses on bimanual examination.  The   patient having tolerated the procedure well, was then transferred to the   Recovery Room in stable condition.        KARLA  dd: 08/05/2019 15:06:42 (CDT)  td: 08/05/2019 19:24:54 (CYNTHIA)  Doc ID   #[id number]  Job ID #[id number]    CC:

## 2019-08-05 NOTE — DISCHARGE INSTRUCTIONS
General Information:    1.  Do not drink alcoholic beverages including beer for 24 hours or as long as you are on pain medication..  2.  Do not drive a motor vehicle, operate machinery or power tools, or signs legal papers for 24 hours or as long as you are on pain medication.   3.  You may experience light-headedness, dizziness, and sleepiness following surgery. Please do not stay alone. A responsible adult should be with you for this 24 hour period.  4.  Go home and rest.    5. Progress slowly to a normal diet unless instructed.  Otherwise, begin with liquids such as soft drinks, then soup and crackers working up to solid foods. Drink plenty of nonalcoholic fluids.  6.  Certain anesthetics and pain medications produce nausea and vomiting in certain       individuals. If nausea becomes a problem at home, call you doctor.    7. A nurse will be calling you sometime after surgery. Do not be alarmed. This is our way of finding out how you are doing.    8. Several times every hour while you are awake, take 2-3 deep breaths and cough. If you had stomach surgery hold a pillow or rolled towel firmly against your stomach before you cough. This will help with any pain the cough might cause.  9. Several times every hour while you are awake, pump and flex your feet 5-6 times and do foot circles. This will help prevent blood clots.    10.Call your doctor for severe pain, bleeding, fever, or signs or symptoms of infection (pain, swelling, redness, foul odor, drainage).      Discharge Instructions: After Your Surgery/Procedure  Youve just had surgery. During surgery you were given medicine called anesthesia to keep you relaxed and free of pain. After surgery you may have some pain or nausea. This is common. Here are some tips for feeling better and getting well after surgery.     Stay on schedule with your medication.   Going home  Your doctor or nurse will show you how to take care of yourself when you go home. He or she will  "also answer your questions. Have an adult family member or friend drive you home.      For your safety we recommend these precaution for the first 24 hours after your procedure:  · Do not drive or use heavy equipment.  · Do not make important decisions or sign legal papers.  · Do not drink alcohol.  · Have someone stay with you, if needed. He or she can watch for problems and help keep you safe.  · Your concentration, balance, coordination, and judgement may be impaired for many hours after anesthesia.  Use caution when ambulating or standing up.     · You may feel weak and "washed out" after anesthesia and surgery.      Subtle residual effects of general anesthesia or sedation with regional / local anesthesia can last more than 24 hours.  Rest for the remainder of the day or longer if your Doctor/Surgeon has advised you to do so.  Although you may feel normal within the first 24 hours, your reflexes and mental ability may be impaired without you realizing it.  You may feel dizzy, lightheaded or sleepy for 24 hours or longer.      Be sure to go to all follow-up visits with your doctor. And rest after your surgery for as long as your doctor tells you to.  Coping with pain  If you have pain after surgery, pain medicine will help you feel better. Take it as told, before pain becomes severe. Also, ask your doctor or pharmacist about other ways to control pain. This might be with heat, ice, or relaxation. And follow any other instructions your surgeon or nurse gives you.  Tips for taking pain medicine  To get the best relief possible, remember these points:  · Pain medicines can upset your stomach. Taking them with a little food may help.  · Most pain relievers taken by mouth need at least 20 to 30 minutes to start to work.  · Taking medicine on a schedule can help you remember to take it. Try to time your medicine so that you can take it before starting an activity. This might be before you get dressed, go for a walk, " or sit down for dinner.  · Constipation is a common side effect of pain medicines. Call your doctor before taking any medicines such as laxatives or stool softeners to help ease constipation. Also ask if you should skip any foods. Drinking lots of fluids and eating foods such as fruits and vegetables that are high in fiber can also help. Remember, do not take laxatives unless your surgeon has prescribed them.  · Drinking alcohol and taking pain medicine can cause dizziness and slow your breathing. It can even be deadly. Do not drink alcohol while taking pain medicine.  · Pain medicine can make you react more slowly to things. Do not drive or run machinery while taking pain medicine.  Your health care provider may tell you to take acetaminophen to help ease your pain. Ask him or her how much you are supposed to take each day. Acetaminophen or other pain relievers may interact with your prescription medicines or other over-the-counter (OTC) drugs. Some prescription medicines have acetaminophen and other ingredients. Using both prescription and OTC acetaminophen for pain can cause you to overdose. Read the labels on your OTC medicines with care. This will help you to clearly know the list of ingredients, how much to take, and any warnings. It may also help you not take too much acetaminophen. If you have questions or do not understand the information, ask your pharmacist or health care provider to explain it to you before you take the OTC medicine.  Managing nausea  Some people have an upset stomach after surgery. This is often because of anesthesia, pain, or pain medicine, or the stress of surgery. These tips will help you handle nausea and eat healthy foods as you get better. If you were on a special food plan before surgery, ask your doctor if you should follow it while you get better. These tips may help:  · Do not push yourself to eat. Your body will tell you when to eat and how much.  · Start off with clear  liquids and soup. They are easier to digest.  · Next try semi-solid foods, such as mashed potatoes, applesauce, and gelatin, as you feel ready.  · Slowly move to solid foods. Dont eat fatty, rich, or spicy foods at first.  · Do not force yourself to have 3 large meals a day. Instead eat smaller amounts more often.  · Take pain medicines with a small amount of solid food, such as crackers or toast, to avoid nausea.     Call your surgeon if  · You still have pain an hour after taking medicine. The medicine may not be strong enough.  · You feel too sleepy, dizzy, or groggy. The medicine may be too strong.  · You have side effects like nausea, vomiting, or skin changes, such as rash, itching, or hives.       If you have obstructive sleep apnea  You were given anesthesia medicine during surgery to keep you comfortable and free of pain. After surgery, you may have more apnea spells because of this medicine and other medicines you were given. The spells may last longer than usual.   At home:  · Keep using the continuous positive airway pressure (CPAP) device when you sleep. Unless your health care provider tells you not to, use it when you sleep, day or night. CPAP is a common device used to treat obstructive sleep apnea.  · Talk with your provider before taking any pain medicine, muscle relaxants, or sedatives. Your provider will tell you about the possible dangers of taking these medicines.  © 1695-3319 [employer]. [address] [place] [id number]. All rights reserved. This information is not intended as a substitute for professional medical care. Always follow your healthcare professional's instructions.      Post op instructions for prevention of DVT  What is deep vein thrombosis?  Deep vein thrombosis (DVT) is the medical term for blood clots in the deep veins of the leg.  These blood clots can be dangerous.  A DVT can block a blood vessel and keep blood from getting where it needs to go.   Another problem is that the clot can travel to other parts of the body such as the lungs.  A clot that travels to the lungs is called a pulmonary embolus (PE) and can cause serious problems with breathing which can lead to death.  Am I at risk for DVT/PE?  If you are not very active, you are at risk of DVT.  Anyone confined to bed, sitting for long periods of time, recovering from surgery, etc. increases the risk of DVT.  Other risk factors are cancer diagnosis, certain medications, estrogen replacement in any form,older age, obesity, pregnancy, smoking, history of clotting disorders, and dehydration.  How will I know if I have a DVT?   Swelling in the lower leg   Pain   Warmth, redness, hardness or bulging of the vein  If you have any of these symptoms, call your doctors office right away.  Some people will not have any symptoms until the clot moves to the lungs.  What are the symptoms of a PE?   Panting, shortness of breath, or trouble breathing   Sharp, knife-like chest pain when you breathe   Coughing or coughing up blood   Rapid heartbeat  If you have any of these symptoms or get worse quickly, call 911 for emergency treatment.  How can I prevent a DVT?   Avoid long periods of inactivity and dont cross your legs--get up and walk around every hour or so.   Stay active--walking after surgery is highly encouraged.  This means you should get out of the house and walk in the neighborhood.  Going up and down stairs will not impair healing (unless advised against such activity by your doctor).     Drink plenty of noncaffeinated, nonalcoholic fluids each day to prevent dehydration.   Wear special support stockings, if they have been advised by your doctor.   If you travel, stop at least once an hour and walk around.   Avoid smoking (assistance with stopping is available through your healthcare provider)  Always notify your doctor if you are not able to follow the post operative instructions that are  given to you at the time of discharge.  It may be necessary to prescribe one of the medications available to prevent DVT.      We hope your stay was comfortable as you heal now, mend and rest.    For we have enjoyed taking care of you by giving your our best.    And as you get better, by regaining your health and strength;   We count it as a privilege to have served you and hope your time at [facility] was well spent.      Thank  You!!!

## 2019-08-06 NOTE — PLAN OF CARE
Post op called made, numbers in chart are parents numbers. Father could not give me a number to call pt, states he will check on him and give a call if son needs anything.

## 2022-10-24 ENCOUNTER — OFFICE VISIT (OUTPATIENT)
Dept: UROLOGY | Facility: CLINIC | Age: 22
End: 2022-10-24
Payer: COMMERCIAL

## 2022-10-24 VITALS
DIASTOLIC BLOOD PRESSURE: 77 MMHG | HEART RATE: 69 BPM | SYSTOLIC BLOOD PRESSURE: 114 MMHG | WEIGHT: 125.69 LBS | BODY MASS INDEX: 19.73 KG/M2 | HEIGHT: 67 IN

## 2022-10-24 DIAGNOSIS — N39.0 URINARY TRACT INFECTION WITHOUT HEMATURIA, SITE UNSPECIFIED: Primary | ICD-10-CM

## 2022-10-24 LAB
BILIRUBIN, UA POC OHS: NEGATIVE
BLOOD, UA POC OHS: NEGATIVE
CLARITY, UA POC OHS: CLEAR
COLOR, UA POC OHS: YELLOW
GLUCOSE, UA POC OHS: NEGATIVE
KETONES, UA POC OHS: NEGATIVE
LEUKOCYTES, UA POC OHS: ABNORMAL
NITRITE, UA POC OHS: NEGATIVE
PH, UA POC OHS: 7.5
PROTEIN, UA POC OHS: NEGATIVE
SPECIFIC GRAVITY, UA POC OHS: 1.02
UROBILINOGEN, UA POC OHS: 1

## 2022-10-24 PROCEDURE — 99204 OFFICE O/P NEW MOD 45 MIN: CPT | Mod: S$GLB,,, | Performed by: NURSE PRACTITIONER

## 2022-10-24 PROCEDURE — 99204 PR OFFICE/OUTPT VISIT, NEW, LEVL IV, 45-59 MIN: ICD-10-PCS | Mod: S$GLB,,, | Performed by: NURSE PRACTITIONER

## 2022-10-24 PROCEDURE — 81003 URINALYSIS AUTO W/O SCOPE: CPT | Mod: S$GLB,,, | Performed by: NURSE PRACTITIONER

## 2022-10-24 PROCEDURE — 81003 POCT URINALYSIS(INSTRUMENT): ICD-10-PCS | Mod: S$GLB,,, | Performed by: NURSE PRACTITIONER

## 2022-10-24 PROCEDURE — 99999 PR PBB SHADOW E&M-NEW PATIENT-LVL III: ICD-10-PCS | Mod: PBBFAC,,, | Performed by: NURSE PRACTITIONER

## 2022-10-24 PROCEDURE — 99999 PR PBB SHADOW E&M-NEW PATIENT-LVL III: CPT | Mod: PBBFAC,,, | Performed by: NURSE PRACTITIONER

## 2022-10-24 PROCEDURE — 87086 URINE CULTURE/COLONY COUNT: CPT | Performed by: NURSE PRACTITIONER

## 2022-10-24 RX ORDER — DOXYCYCLINE 100 MG/1
100 CAPSULE ORAL 2 TIMES DAILY
Qty: 42 CAPSULE | Refills: 0 | Status: SHIPPED | OUTPATIENT
Start: 2022-10-24 | End: 2022-11-14

## 2022-10-24 NOTE — PROGRESS NOTES
[facility] Note  Staff: RAMIRO Loera    PCP: N/A    Chief Complaint: UTI symptoms, Dysuria    Subjective:        HPI: Michele Perez is a 21 y.o. male presents today in office with c/o dysuria which began over five days ago with no improvement in symptoms at this time.    The pt was last evaluated by Dr. Gustafson in 2019 for hx of recurrent UTIs and dysuria symptoms.    Several years ago, pt presented with approximately one-month   history of intermittent episodes of hematuria and dysuria and at that time, he   was placed on doxycycline 100 mg p.o. b.i.d., which resolved the problem, but   The symptoms then returned.   Currently, he is not on any antibiotics and   denies any fever, chills, no any other complaints.  However pt has been taking OTC AZO for his current LUTS.    Family Hx of  Cancers?:  Unknown  Current Tobacco use?:  None    Pt denies gross hematuria    UA in office today showed trace leukocytes, 7.5 ph, sg of 1.025    REVIEW OF SYSTEMS:  A comprehensive 10 system review was performed and is negative except as noted above in HPI    PMHx:  Past Medical History:   Diagnosis Date    ADHD (attention deficit hyperactivity disorder)     no current medications    Allergy     seasonal rhinitis    Asthma     primarily exercise-induced asthma    Learning disability     speech related     PSHx:  Past Surgical History:   Procedure Laterality Date    ADENOIDECTOMY  2002    CYSTOSCOPY N/A 8/5/2019    Procedure: CYSTOSCOPY;  Surgeon: Earl Gustafson MD;  Location: [facility] OR;  Service: Urology;  Laterality: N/A;    CYSTOSCOPY WITH BIOPSY OF BLADDER N/A 8/5/2019    Procedure: CYSTOSCOPY, WITH BLADDER BIOPSY, WITH FULGURATION IF INDICATED;  Surgeon: Earl Gustafson MD;  Location: [facility] OR;  Service: Urology;  Laterality: N/A;    TONSILLECTOMY  2002    TYMPANOSTOMY TUBE PLACEMENT  2002     Allergies:  Patient has no known allergies.    Medications: reviewed   Objective:      Vitals:    10/24/22 1310   BP: 114/77   Pulse: 69     General:WDWN in NAD  Eyes: PERRLA, normal conjunctiva  Respiratory: no increased work on breathing, clear to auscultation  Cardiovascular: regular rate and rhythm. No obvious extremity edema.  GI: palpation of masses. No tenderness. No hepatosplenomegaly to palpation.  Musculoskeletal: normal range of motion of bilateral upper extremities. Normal muscle strength and tone.  Skin: no obvious rashes or lesions. No tightening of skin noted.  Neurologic: CN grossly normal. Normal sensation.   Psychiatric: awake, alert and oriented x 3. Mood and affect normal. Cooperative.    Assessment:       1. Urinary tract infection without hematuria, site unspecified            Plan:   Dysuria, UTI symptoms:    Urine culture to be processed at this time.  In the meantime, Doxcycline 100 mg BID x 21 days prescribed to this pt during ov today.  The med prescribed to pt today as trial to see if med improves pt's current LUTS.  Benefits, risks and side affects were thoroughly explained to pt today in office with all questions answered.    F/UP-Once we receive urine results back, then we will contact this pt for f/up visit in the near future.  Pt verbalized understanding at this time.    Clarita Durán, RAMIRO

## 2022-10-26 LAB — BACTERIA UR CULT: NO GROWTH

## 2022-11-30 ENCOUNTER — OFFICE VISIT (OUTPATIENT)
Dept: UROLOGY | Facility: CLINIC | Age: 22
End: 2022-11-30
Payer: COMMERCIAL

## 2022-11-30 VITALS
HEIGHT: 67 IN | BODY MASS INDEX: 19.73 KG/M2 | HEART RATE: 79 BPM | WEIGHT: 125.69 LBS | SYSTOLIC BLOOD PRESSURE: 120 MMHG | DIASTOLIC BLOOD PRESSURE: 77 MMHG

## 2022-11-30 DIAGNOSIS — N41.9 PROSTATITIS, UNSPECIFIED PROSTATITIS TYPE: Primary | ICD-10-CM

## 2022-11-30 DIAGNOSIS — R86.8 DISCOLORED SEMEN: ICD-10-CM

## 2022-11-30 PROCEDURE — 99999 PR PBB SHADOW E&M-EST. PATIENT-LVL III: ICD-10-PCS | Mod: PBBFAC,,, | Performed by: NURSE PRACTITIONER

## 2022-11-30 PROCEDURE — 99213 PR OFFICE/OUTPT VISIT, EST, LEVL III, 20-29 MIN: ICD-10-PCS | Mod: S$GLB,,, | Performed by: NURSE PRACTITIONER

## 2022-11-30 PROCEDURE — 99999 PR PBB SHADOW E&M-EST. PATIENT-LVL III: CPT | Mod: PBBFAC,,, | Performed by: NURSE PRACTITIONER

## 2022-11-30 PROCEDURE — 99213 OFFICE O/P EST LOW 20 MIN: CPT | Mod: S$GLB,,, | Performed by: NURSE PRACTITIONER

## 2022-11-30 RX ORDER — DOXYCYCLINE HYCLATE 100 MG
100 TABLET ORAL 2 TIMES DAILY
Qty: 60 TABLET | Refills: 0 | Status: SHIPPED | OUTPATIENT
Start: 2022-11-30 | End: 2022-12-07

## 2022-11-30 NOTE — PROGRESS NOTES
[facility] Note  Staff: RAMIRO Loera    PCP: None    Chief Complaint: Discolored semen    Subjective:        HPI: Michele Perez is a 21 y.o. male presents with   Pt was last evaluated by me on 10/24/22 for UTI symptoms and Dysuria.  At that time he was treated for possible prostatitis.  Today, pt RTC c/o symptoms have returned, less ejaculation of semen observed with discoloration at this time.  Pt does admit during ov today that he continued having intercourse while on antibiotics after last ov.     Exam:  The pt was last evaluated by Dr. Gustafson in 2019 for hx of recurrent UTIs and dysuria symptoms.     Several years ago, pt presented with approximately one-month   history of intermittent episodes of hematuria and dysuria and at that time, he   was placed on doxycycline 100 mg p.o. b.i.d., which resolved the problem, but   The symptoms then returned.   Currently, he is not on any antibiotics and   denies any fever, chills, no any other complaints.  However pt has been taking OTC AZO for his current LUTS.     Family Hx of  Cancers?:  Unknown  Current Tobacco use?:  None     Pt denies gross hematuria       REVIEW OF SYSTEMS:  A comprehensive 10 system review was performed and is negative except as noted above in HPI    PMHx:  Past Medical History:   Diagnosis Date    ADHD (attention deficit hyperactivity disorder)     no current medications    Allergy     seasonal rhinitis    Asthma     primarily exercise-induced asthma    Learning disability     speech related     PSHx:  Past Surgical History:   Procedure Laterality Date    ADENOIDECTOMY  2002    CYSTOSCOPY N/A 8/5/2019    Procedure: CYSTOSCOPY;  Surgeon: Earl Gustafson MD;  Location: [facility] OR;  Service: Urology;  Laterality: N/A;    CYSTOSCOPY WITH BIOPSY OF BLADDER N/A 8/5/2019    Procedure: CYSTOSCOPY, WITH BLADDER BIOPSY, WITH FULGURATION IF INDICATED;  Surgeon: Earl Gustafson MD;  Location: [facility] OR;  Service:  Urology;  Laterality: N/A;    TONSILLECTOMY  2002    TYMPANOSTOMY TUBE PLACEMENT  2002     Allergies:  Patient has no known allergies.    Medications: reviewed   Objective:     Vitals:    11/30/22 0829   BP: 120/77   Pulse: 79     General:WDWN in NAD  Eyes: PERRLA, normal conjunctiva  Respiratory: no increased work on breathing, clear to auscultation  Cardiovascular: regular rate and rhythm. No obvious extremity edema.  GI: palpation of masses. No tenderness. No hepatosplenomegaly to palpation.  Musculoskeletal: normal range of motion of bilateral upper extremities. Normal muscle strength and tone.  Skin: no obvious rashes or lesions. No tightening of skin noted.  Neurologic: CN grossly normal. Normal sensation.   Psychiatric: awake, alert and oriented x 3. Mood and affect normal. Cooperative.    Assessment:       1. Prostatitis, unspecified prostatitis type    2. Discolored semen          Plan:      Repeat regimen of Doxy 100 mg BID x 30 days prescribed to this pt during ov today.  Pt was advised NO INTERCOURSE until pt and partner are evaluated and completes treatment with antibiotics.  If not, infection will never dissipate.    F/u--Pt was advised that should symptoms continue then will have to be further evaluated by MD.  Pt verbalized understanding at this time.    [facility]: Active    Clarita Durán, RAMIRO

## 2022-12-07 ENCOUNTER — PATIENT MESSAGE (OUTPATIENT)
Dept: UROLOGY | Facility: CLINIC | Age: 22
End: 2022-12-07
Payer: COMMERCIAL

## 2022-12-07 RX ORDER — IBUPROFEN 600 MG/1
600 TABLET ORAL EVERY 6 HOURS PRN
Qty: 20 TABLET | Refills: 0 | Status: SHIPPED | OUTPATIENT
Start: 2022-12-07 | End: 2023-03-20

## 2022-12-07 RX ORDER — SULFAMETHOXAZOLE AND TRIMETHOPRIM 800; 160 MG/1; MG/1
1 TABLET ORAL 2 TIMES DAILY
Qty: 42 TABLET | Refills: 0 | Status: SHIPPED | OUTPATIENT
Start: 2022-12-07 | End: 2022-12-28

## 2022-12-21 ENCOUNTER — TELEPHONE (OUTPATIENT)
Dept: CARDIOLOGY | Facility: CLINIC | Age: 22
End: 2022-12-21
Payer: COMMERCIAL

## 2023-03-20 ENCOUNTER — OFFICE VISIT (OUTPATIENT)
Dept: UROLOGY | Facility: CLINIC | Age: 23
End: 2023-03-20
Payer: COMMERCIAL

## 2023-03-20 VITALS — SYSTOLIC BLOOD PRESSURE: 108 MMHG | HEART RATE: 76 BPM | DIASTOLIC BLOOD PRESSURE: 67 MMHG

## 2023-03-20 DIAGNOSIS — R30.0 DYSURIA: Primary | ICD-10-CM

## 2023-03-20 DIAGNOSIS — R36.9 PENILE DISCHARGE: ICD-10-CM

## 2023-03-20 LAB
BACTERIA #/AREA URNS HPF: ABNORMAL /HPF
MICROSCOPIC COMMENT: ABNORMAL
RBC #/AREA URNS HPF: 80 /HPF (ref 0–4)
WBC #/AREA URNS HPF: 20 /HPF (ref 0–5)

## 2023-03-20 PROCEDURE — 99214 PR OFFICE/OUTPT VISIT, EST, LEVL IV, 30-39 MIN: ICD-10-PCS | Mod: S$GLB,,, | Performed by: NURSE PRACTITIONER

## 2023-03-20 PROCEDURE — 99999 PR PBB SHADOW E&M-EST. PATIENT-LVL III: ICD-10-PCS | Mod: PBBFAC,,, | Performed by: NURSE PRACTITIONER

## 2023-03-20 PROCEDURE — 81000 URINALYSIS NONAUTO W/SCOPE: CPT | Performed by: NURSE PRACTITIONER

## 2023-03-20 PROCEDURE — 99999 PR PBB SHADOW E&M-EST. PATIENT-LVL III: CPT | Mod: PBBFAC,,, | Performed by: NURSE PRACTITIONER

## 2023-03-20 PROCEDURE — 99214 OFFICE O/P EST MOD 30 MIN: CPT | Mod: S$GLB,,, | Performed by: NURSE PRACTITIONER

## 2023-03-20 PROCEDURE — 87086 URINE CULTURE/COLONY COUNT: CPT | Performed by: NURSE PRACTITIONER

## 2023-03-20 RX ORDER — DOXYCYCLINE 100 MG/1
100 CAPSULE ORAL 2 TIMES DAILY
Qty: 28 CAPSULE | Refills: 0 | Status: SHIPPED | OUTPATIENT
Start: 2023-03-20 | End: 2023-04-03

## 2023-03-20 RX ORDER — IBUPROFEN 600 MG/1
600 TABLET ORAL 2 TIMES DAILY WITH MEALS
Qty: 28 TABLET | Refills: 0 | Status: SHIPPED | OUTPATIENT
Start: 2023-03-20 | End: 2023-04-03

## 2023-03-20 NOTE — PROGRESS NOTES
CHIEF COMPLAINT:    Mr. Perez is a 22 y.o. male presenting for dysuria and penile discharge.  PRESENTING ILLNESS:    Michele Perez is a 22 y.o. male who presents for dysuria and penile discharge. Last clinic visit was 11/30/22 with Clarita Durán NP.    11/30/22  Pt was last evaluated by me on 10/24/22 for UTI symptoms and Dysuria.  At that time he was treated for possible prostatitis.  Today, pt RTC c/o symptoms have returned, less ejaculation of semen observed with discoloration at this time.  Pt does admit during ov today that he continued having intercourse while on antibiotics after last ov.   Exam:  The pt was last evaluated by Dr. Gustafson in 2019 for hx of recurrent UTIs and dysuria symptoms.   Several years ago, pt presented with approximately one-month   history of intermittent episodes of hematuria and dysuria and at that time, he   was placed on doxycycline 100 mg p.o. b.i.d., which resolved the problem, but   The symptoms then returned.   Currently, he is not on any antibiotics and   denies any fever, chills, no any other complaints.  However pt has been taking OTC AZO for his current LUTS.   Family Hx of  Cancers?:  Unknown  Current Tobacco use?:  None   Pt denies gross hematuria   Repeat regimen of Doxy 100 mg BID x 30 days prescribed to this pt during ov today.  Pt was advised NO INTERCOURSE until pt and partner are evaluated and completes treatment with antibiotics.  If not, infection will never dissipate.       3/20/23  Patient presents to clinic today for mild dysuria and white penile discharge for the past 2-3 days. No odor to discharge. Denies gross hematuria, flank pain, fever, chills, nausea or vomiting. Strong urinary stream. Denies testicle pain or scrotal swelling. Denies urinary frequency, urgency or leakage. Denies intermittent stream or hesitancy.   Denies any new partners or unprotected intercourse since last clinic visit. He is not concerned for STI's.    Urine  cultures:   Lab Results   Component Value Date    LABURIN No growth 10/24/2022    LABURIN  06/27/2019     Multiple organisms isolated. None in predominance.  Repeat if    LABURIN clinically necessary. 06/27/2019    LABURIN  04/05/2019     COAGULASE-NEGATIVE STAPHYLOCOCCUS SPECIES  10,000 - 49,999 cfu/ml  Susceptibility testing not routinely performed.  No other significant isolate             REVIEW OF SYSTEMS:    Review of Systems    Constitutional: Negative for fever and chills.   HENT: Negative for hearing loss.   Eyes: Negative for visual disturbance.   Respiratory: Negative for shortness of breath.   Cardiovascular: Negative for chest pain.   Gastrointestinal: Negative for nausea, vomiting, and constipation.   Genitourinary: See above  Neurological: Negative for dizziness.   Hematological: Does not bruise/bleed easily.   Psychiatric/Behavioral: Negative for confusion.       PATIENT HISTORY:    Past Medical History:   Diagnosis Date    ADHD (attention deficit hyperactivity disorder)     no current medications    Allergy     seasonal rhinitis    Asthma     primarily exercise-induced asthma    Learning disability     speech related       Past Surgical History:   Procedure Laterality Date    ADENOIDECTOMY  2002    CYSTOSCOPY N/A 8/5/2019    Procedure: CYSTOSCOPY;  Surgeon: Earl Gustafson MD;  Location: [facility] OR;  Service: Urology;  Laterality: N/A;    CYSTOSCOPY WITH BIOPSY OF BLADDER N/A 8/5/2019    Procedure: CYSTOSCOPY, WITH BLADDER BIOPSY, WITH FULGURATION IF INDICATED;  Surgeon: Earl Gustafson MD;  Location: [facility] OR;  Service: Urology;  Laterality: N/A;    TONSILLECTOMY  2002    TYMPANOSTOMY TUBE PLACEMENT  2002       No family history on file.    Social History     Socioeconomic History    Marital status: Single   Tobacco Use    Smoking status: Never    Smokeless tobacco: Never   Substance and Sexual Activity    Alcohol use: Yes     Comment: OCCASIONALLY    Drug use: Yes     Types: Marijuana    Sexual  activity: Never   Social History Narrative    PMH:ADHD inattentive type, ODD and LD with speech impairment in 2005, not taking any medications now; T&A with PET in 2002; AR with occasional sinusitis last in 2/10, rare asthma last attacks 12/09, 4/09 and 1/08, worse with sports    SH:intact family, 3 siblings, no smokers or pets    FH:RAD, AR, DM in [facility], MGGF had AMI death, MGM has HTN; PGM, cousins and PA have SCT, MGA had breast cancer    SCHOOL/ACTIVITIES:6th grade good student, active in basketball and baseball    LAB:Normal lipids in 2009 and UA in 2007; Hgb 16.4 in 8/11                   Allergies:  Patient has no known allergies.    Medications:    Current Outpatient Medications:     doxycycline (VIBRAMYCIN) 100 MG Cap, Take 1 capsule (100 mg total) by mouth 2 (two) times daily. for 14 days, Disp: 28 capsule, Rfl: 0    ibuprofen (ADVIL,MOTRIN) 600 MG tablet, Take 1 tablet (600 mg total) by mouth 2 (two) times daily with meals. for 14 days, Disp: 28 tablet, Rfl: 0  No current facility-administered medications for this visit.    Facility-Administered Medications Ordered in Other Visits:     lactated ringers infusion, , Intravenous, Continuous, Maximus Rdz MD, Stopped at 08/05/19 1637    lidocaine (PF) 10 mg/ml (1%) injection 10 mg, 1 mL, Intradermal, Once, Maximus Rdz MD    PHYSICAL EXAMINATION:    Constitutional: He is oriented to person, place, and time. He appears well-developed and well-nourished.  He is in no apparent distress.    Neck: Normal ROM.     Cardiovascular: Normal rate.      Pulmonary/Chest: Effort normal. No respiratory distress.     Abdominal:  He exhibits no distension.  There is no CVA tenderness.     Neurological: He is alert and oriented to person, place, and time.     Skin: Skin is warm and dry.     Psych: Cooperative with normal affect.    Genitourinary: The penis is circumcised. The urethral meatus is normal. The testes, epididymides, and cord structures are normal in size  and contour bilaterally. The scrotum is normal in size and contour.    Physical Exam      LABS:    No results found for: PSA, PSADIAG, PSATOTAL, PSAFREE, PSAFREEPCT  Lab Results   Component Value Date    CREATININE 0.8 08/01/2019         IMPRESSION:    Encounter Diagnoses   Name Primary?    Dysuria Yes    Penile discharge          PLAN:  -Urine sent for micro UA and culture. Offered STI testing but patient declined. Will call with results.  -Start doxycycline.  Discussed side effects, indications, and MOA for doxycycline. Prescription sent to the pharmacy. Pt verbalized understanding. Denies issues with doxycycline in the past.  Pt has hx of prostatitis.  Ibuprofen as needed for discomfort.  -Good water intake    Avoid Bladder Irritants: Tea, coffee, caffeine, alcohol, artificial sweeteners, citrus, spicy foods, acidic foods,chocolate, tomato-based foods, smoking    Void every 2-3 hours regardless of urge. Avoid holding once urge occurs    Avoid constipation    -RTC 4 weeks  If symptoms persist and negative UC, possible cysto with MD    I encouraged him or any of his family members to call or email me with questions and/or concerns.      30 minutes of total time spent on the encounter, which includes face to face time and non-face to face time preparing to see the patient (eg, review of tests), Obtaining and/or reviewing separately obtained history, Documenting clinical information in the electronic or other health record, Independently interpreting results (not separately reported) and communicating results to the patient/family/caregiver, or Care coordination (not separately reported).

## 2023-03-20 NOTE — PATIENT INSTRUCTIONS
Good water intake    Avoid Bladder Irritants: Tea, coffee, caffeine, alcohol, artificial sweeteners, citrus, spicy foods, acidic foods,chocolate, tomato-based foods, smoking    Void every 2-3 hours regardless of urge. Avoid holding once urge occurs    Avoid constipation

## 2023-03-22 ENCOUNTER — TELEPHONE (OUTPATIENT)
Dept: UROLOGY | Facility: CLINIC | Age: 23
End: 2023-03-22
Payer: COMMERCIAL

## 2023-03-22 LAB
BACTERIA UR CULT: NORMAL
BACTERIA UR CULT: NORMAL

## 2023-03-23 ENCOUNTER — TELEPHONE (OUTPATIENT)
Dept: UROLOGY | Facility: CLINIC | Age: 23
End: 2023-03-23
Payer: COMMERCIAL

## 2023-03-24 ENCOUNTER — TELEPHONE (OUTPATIENT)
Dept: UROLOGY | Facility: CLINIC | Age: 23
End: 2023-03-24
Payer: COMMERCIAL

## 2023-03-24 ENCOUNTER — PATIENT MESSAGE (OUTPATIENT)
Dept: UROLOGY | Facility: CLINIC | Age: 23
End: 2023-03-24
Payer: COMMERCIAL

## 2023-03-24 NOTE — TELEPHONE ENCOUNTER
Called patient regarding urine results.  No answer  Unable to leave voicemail.  Portal message sent yesterday

## 2023-03-24 NOTE — TELEPHONE ENCOUNTER
----- Message from Margarette Evans NP sent at 3/24/2023  9:46 AM CDT -----  Regarding: f/u appt  I have called this patient 3 times and sent message via portal. Can we just schedule him a follow up appt in 2 weeks and send message on portal. Hopefully he will come.    Thanks  Margarette

## 2023-03-27 ENCOUNTER — OFFICE VISIT (OUTPATIENT)
Dept: UROLOGY | Facility: CLINIC | Age: 23
End: 2023-03-27
Payer: COMMERCIAL

## 2023-03-27 VITALS — HEIGHT: 67 IN | BODY MASS INDEX: 19.68 KG/M2

## 2023-03-27 DIAGNOSIS — R30.0 DYSURIA: Primary | ICD-10-CM

## 2023-03-27 PROCEDURE — 99214 OFFICE O/P EST MOD 30 MIN: CPT | Mod: S$GLB,,, | Performed by: NURSE PRACTITIONER

## 2023-03-27 PROCEDURE — 99999 PR PBB SHADOW E&M-EST. PATIENT-LVL III: ICD-10-PCS | Mod: PBBFAC,,, | Performed by: NURSE PRACTITIONER

## 2023-03-27 PROCEDURE — 99999 PR PBB SHADOW E&M-EST. PATIENT-LVL III: CPT | Mod: PBBFAC,,, | Performed by: NURSE PRACTITIONER

## 2023-03-27 PROCEDURE — 99214 PR OFFICE/OUTPT VISIT, EST, LEVL IV, 30-39 MIN: ICD-10-PCS | Mod: S$GLB,,, | Performed by: NURSE PRACTITIONER

## 2023-03-27 NOTE — PATIENT INSTRUCTIONS
After completing antibiotics 4/3/23, go to [facility] lab and give lab collect urine specimen. Will call or send portal with results.

## 2023-03-27 NOTE — PROGRESS NOTES
CHIEF COMPLAINT:    Mr. Perez is a 22 y.o. male presenting for f/u dysuria and penile discharge.  PRESENTING ILLNESS:    Michele Perez is a 22 y.o. male who presents for f/u dysuria and penile discharge. Last clinic visit was 3/20/23.    11/30/22  Pt was last evaluated by me on 10/24/22 for UTI symptoms and Dysuria.  At that time he was treated for possible prostatitis.  Today, pt RTC c/o symptoms have returned, less ejaculation of semen observed with discoloration at this time.  Pt does admit during ov today that he continued having intercourse while on antibiotics after last ov.   Exam:  The pt was last evaluated by Dr. Gustafson in 2019 for hx of recurrent UTIs and dysuria symptoms.   Several years ago, pt presented with approximately one-month   history of intermittent episodes of hematuria and dysuria and at that time, he   was placed on doxycycline 100 mg p.o. b.i.d., which resolved the problem, but   The symptoms then returned.   Currently, he is not on any antibiotics and   denies any fever, chills, no any other complaints.  However pt has been taking OTC AZO for his current LUTS.   Family Hx of  Cancers?:  Unknown  Current Tobacco use?:  None   Pt denies gross hematuria   Repeat regimen of Doxy 100 mg BID x 30 days prescribed to this pt during ov today.  Pt was advised NO INTERCOURSE until pt and partner are evaluated and completes treatment with antibiotics.  If not, infection will never dissipate.       3/20/23  Patient presents to clinic today for mild dysuria and white penile discharge for the past 2-3 days. No odor to discharge. Denies gross hematuria, flank pain, fever, chills, nausea or vomiting. Strong urinary stream. Denies testicle pain or scrotal swelling. Denies urinary frequency, urgency or leakage. Denies intermittent stream or hesitancy.   Denies any new partners or unprotected intercourse since last clinic visit. He is not concerned for STI's.    3/27/23  Patient presents to  clinic for f/u dysuria and penile discharge. He is currently taking doxycycline x 1 week. He reports dysuria and penile discharge have resolved. No complaints today in clinic.    3/20/23   Micro UA RBC 80, WBC 20, few bacteria  Culture: Multiple organisms isolated. None in predominance    Urine cultures:   Lab Results   Component Value Date    LABURIN  03/20/2023     Multiple organisms isolated. None in predominance.  Repeat if    LABURIN clinically necessary. 03/20/2023    LABURIN No growth 10/24/2022    LABURIN  06/27/2019     Multiple organisms isolated. None in predominance.  Repeat if    LABURIN clinically necessary. 06/27/2019    LABURIN  04/05/2019     COAGULASE-NEGATIVE STAPHYLOCOCCUS SPECIES  10,000 - 49,999 cfu/ml  Susceptibility testing not routinely performed.  No other significant isolate             REVIEW OF SYSTEMS:    Review of Systems    Constitutional: Negative for fever and chills.   HENT: Negative for hearing loss.   Eyes: Negative for visual disturbance.   Respiratory: Negative for shortness of breath.   Cardiovascular: Negative for chest pain.   Gastrointestinal: Negative for nausea, vomiting, and constipation.   Genitourinary: See above  Neurological: Negative for dizziness.   Hematological: Does not bruise/bleed easily.   Psychiatric/Behavioral: Negative for confusion.       PATIENT HISTORY:    Past Medical History:   Diagnosis Date    ADHD (attention deficit hyperactivity disorder)     no current medications    Allergy     seasonal rhinitis    Asthma     primarily exercise-induced asthma    Learning disability     speech related       Past Surgical History:   Procedure Laterality Date    ADENOIDECTOMY  2002    CYSTOSCOPY N/A 8/5/2019    Procedure: CYSTOSCOPY;  Surgeon: Earl Gustafson MD;  Location: [facility];  Service: Urology;  Laterality: N/A;    CYSTOSCOPY WITH BIOPSY OF BLADDER N/A 8/5/2019    Procedure: CYSTOSCOPY, WITH BLADDER BIOPSY, WITH FULGURATION IF INDICATED;  Surgeon: Earl  MD Sj;  Location: [facility];  Service: Urology;  Laterality: N/A;    TONSILLECTOMY  2002    TYMPANOSTOMY TUBE PLACEMENT  2002       No family history on file.    Social History     Socioeconomic History    Marital status: Single   Tobacco Use    Smoking status: Never    Smokeless tobacco: Never   Substance and Sexual Activity    Alcohol use: Yes     Comment: OCCASIONALLY    Drug use: Yes     Types: Marijuana    Sexual activity: Never   Social History Narrative    PMH:ADHD inattentive type, ODD and LD with speech impairment in 2005, not taking any medications now; T&A with PET in 2002; AR with occasional sinusitis last in 2/10, rare asthma last attacks 12/09, 4/09 and 1/08, worse with sports    SH:intact family, 3 siblings, no smokers or pets    FH:RAD, AR, DM in [facility]GG[facility], MGGF had AMI death, MGM has HTN; PGM, cousins and PA have SCT, MGA had breast cancer    SCHOOL/ACTIVITIES:6th grade good student, active in basketball and baseball    LAB:Normal lipids in 2009 and UA in 2007; Hgb 16.4 in 8/11                   Allergies:  Patient has no known allergies.    Medications:    Current Outpatient Medications:     doxycycline (VIBRAMYCIN) 100 MG Cap, Take 1 capsule (100 mg total) by mouth 2 (two) times daily. for 14 days, Disp: 28 capsule, Rfl: 0    ibuprofen (ADVIL,MOTRIN) 600 MG tablet, Take 1 tablet (600 mg total) by mouth 2 (two) times daily with meals. for 14 days, Disp: 28 tablet, Rfl: 0  No current facility-administered medications for this visit.    Facility-Administered Medications Ordered in Other Visits:     lactated ringers infusion, , Intravenous, Continuous, Maximus Rdz MD, Stopped at 08/05/19 1637    lidocaine (PF) 10 mg/ml (1%) injection 10 mg, 1 mL, Intradermal, Once, Maximus Rdz MD    PHYSICAL EXAMINATION:    Constitutional: He is oriented to person, place, and time. He appears well-developed and well-nourished.  He is in no apparent distress.    Neck: Normal ROM.     Cardiovascular: Normal  rate.      Pulmonary/Chest: Effort normal. No respiratory distress.     Abdominal:  He exhibits no distension.  There is no CVA tenderness.     Neurological: He is alert and oriented to person, place, and time.     Skin: Skin is warm and dry.     Psych: Cooperative with normal affect.    Physical Exam      LABS:    No results found for: PSA, PSADIAG, PSATOTAL, PSAFREE, PSAFREEPCT  Lab Results   Component Value Date    CREATININE 0.8 08/01/2019         IMPRESSION:    Encounter Diagnoses   Name Primary?    Dysuria Yes         PLAN:  -Complete course of doxycycline as ordered  -Repeat UA and culture after completing antibiotics.  Micro UA and culture ordered, lab collect. Explained importance of clean catch specimen. Pt verbalized understanding. Will call with results  -RTC based on urine results    I encouraged him or any of his family members to call or email me with questions and/or concerns.      30 minutes of total time spent on the encounter, which includes face to face time and non-face to face time preparing to see the patient (eg, review of tests), Obtaining and/or reviewing separately obtained history, Documenting clinical information in the electronic or other health record, Independently interpreting results (not separately reported) and communicating results to the patient/family/caregiver, or Care coordination (not separately reported).

## 2023-10-02 ENCOUNTER — HOSPITAL ENCOUNTER (EMERGENCY)
Facility: HOSPITAL | Age: 23
Discharge: HOME | End: 2023-10-03
Attending: EMERGENCY MEDICINE
Payer: COMMERCIAL

## 2023-10-02 DIAGNOSIS — K08.89 PAIN, DENTAL: Primary | ICD-10-CM

## 2023-10-02 PROCEDURE — 99284 EMERGENCY DEPT VISIT MOD MDM: CPT

## 2023-10-03 VITALS
TEMPERATURE: 98 F | DIASTOLIC BLOOD PRESSURE: 79 MMHG | RESPIRATION RATE: 18 BRPM | SYSTOLIC BLOOD PRESSURE: 130 MMHG | BODY MASS INDEX: 19.58 KG/M2 | WEIGHT: 125 LBS | HEART RATE: 68 BPM | OXYGEN SATURATION: 98 %

## 2023-10-03 PROCEDURE — 25000003 PHARM REV CODE 250: Performed by: EMERGENCY MEDICINE

## 2023-10-03 RX ORDER — PENICILLIN V POTASSIUM 500 MG/1
500 TABLET, FILM COATED ORAL
Status: COMPLETED | OUTPATIENT
Start: 2023-10-03 | End: 2023-10-03

## 2023-10-03 RX ORDER — HYDROCODONE BITARTRATE AND ACETAMINOPHEN 5; 325 MG/1; MG/1
1 TABLET ORAL
Status: COMPLETED | OUTPATIENT
Start: 2023-10-03 | End: 2023-10-03

## 2023-10-03 RX ORDER — PENICILLIN V POTASSIUM 500 MG/1
500 TABLET, FILM COATED ORAL EVERY 6 HOURS
Qty: 40 TABLET | Refills: 0 | Status: SHIPPED | OUTPATIENT
Start: 2023-10-03 | End: 2023-12-28

## 2023-10-03 RX ORDER — HYDROCODONE BITARTRATE AND ACETAMINOPHEN 5; 325 MG/1; MG/1
1 TABLET ORAL EVERY 6 HOURS PRN
Qty: 12 TABLET | Refills: 0 | Status: SHIPPED | OUTPATIENT
Start: 2023-10-03 | End: 2023-10-20

## 2023-10-03 RX ADMIN — PENICILLIN V POTASSIUM 500 MG: 500 TABLET, FILM COATED ORAL at 12:10

## 2023-10-03 RX ADMIN — HYDROCODONE BITARTRATE AND ACETAMINOPHEN 1 TABLET: 5; 325 TABLET ORAL at 12:10

## 2023-10-03 NOTE — ED PROVIDER NOTES
Encounter Date: 10/2/2023       History     Chief Complaint   Patient presents with    Dental Problem     Pt states 2 months ago his bottom back tooth cracked. Pt states cold water is painful.      Patient presents emergency department reported dental pain started intermittently a couple months ago after the tooth cracked states over last few days he is increased sensitivity to the area and believes he has an infection he denies any fever chills states to his very temperature sensitive        Review of patient's allergies indicates:  No Known Allergies  Past Medical History:   Diagnosis Date    ADHD (attention deficit hyperactivity disorder)     no current medications    Allergy     seasonal rhinitis    Asthma     primarily exercise-induced asthma    Learning disability     speech related     Past Surgical History:   Procedure Laterality Date    ADENOIDECTOMY  2002    CYSTOSCOPY N/A 8/5/2019    Procedure: CYSTOSCOPY;  Surgeon: Earl Gustafson MD;  Location: [facility] OR;  Service: Urology;  Laterality: N/A;    CYSTOSCOPY WITH BIOPSY OF BLADDER N/A 8/5/2019    Procedure: CYSTOSCOPY, WITH BLADDER BIOPSY, WITH FULGURATION IF INDICATED;  Surgeon: Earl Gustafson MD;  Location: [facility] OR;  Service: Urology;  Laterality: N/A;    TONSILLECTOMY  2002    TYMPANOSTOMY TUBE PLACEMENT  2002     No family history on file.  Social History     Tobacco Use    Smoking status: Never    Smokeless tobacco: Never   Substance Use Topics    Alcohol use: Yes     Comment: OCCASIONALLY    Drug use: Yes     Types: Marijuana     Review of Systems   Constitutional:  Negative for chills and fever.   HENT:  Positive for dental problem. Negative for ear pain, facial swelling and trouble swallowing.        Physical Exam     Initial Vitals [10/02/23 2339]   BP Pulse Resp Temp SpO2   130/79 68 19 97.9 °F (36.6 °C) 98 %      MAP       --         Physical Exam    Constitutional: He appears well-developed and well-nourished. No distress.   HENT:   Head:  Normocephalic and atraumatic.   Right Ear: External ear normal.   Left Ear: External ear normal.   TMs clear bilaterally posterior pharynx normal there is dental caries a right lower premolar with minimal gingival erythema no palpable abscess noted   Eyes: Pupils are equal, round, and reactive to light.   Neck: Neck supple.   Normal range of motion.  Cardiovascular:  Normal rate, regular rhythm and intact distal pulses.           Pulmonary/Chest: No respiratory distress.   Musculoskeletal:      Cervical back: Normal range of motion and neck supple.     Lymphadenopathy:     He has no cervical adenopathy.   Skin: Skin is warm and dry. Capillary refill takes less than 2 seconds.         ED Course   Procedures  Labs Reviewed - No data to display       Imaging Results    None          Medications   HYDROcodone-acetaminophen 5-325 mg per tablet 1 tablet (has no administration in time range)   penicillin v potassium tablet 500 mg (has no administration in time range)     Medical Decision Making  Will refer patient to [facility] of given a dose of pen VK and Norco in the ER prescribe [facility] was outpatient return to ER for any worsened symptoms new symptoms    Risk  Prescription drug management.                               Clinical Impression:   Final diagnoses:  [K08.89] Pain, dental (Primary)        ED Disposition Condition    Discharge Stable          ED Prescriptions       Medication Sig Dispense Start Date End Date Auth. Provider    HYDROcodone-acetaminophen (NORCO) 5-325 mg per tablet Take 1 tablet by mouth every 6 (six) hours as needed. 12 tablet 10/3/2023 -- Flaco Dejesus MD    penicillin v potassium (VEETID) 500 MG tablet Take 1 tablet (500 mg total) by mouth every 6 (six) hours. 40 tablet 10/3/2023 -- Flaco Dejesus MD          Follow-up Information       Follow up With Specialties Details Why Contact Info    [facility]  Call in 1 day for re-examination of your symptoms [phone]              Flaco Dejesus MD  10/03/23 0013       Flaco Dejesus MD  10/03/23 0019

## 2023-10-20 ENCOUNTER — OFFICE VISIT (OUTPATIENT)
Dept: URGENT CARE | Facility: CLINIC | Age: 23
End: 2023-10-20
Payer: COMMERCIAL

## 2023-10-20 VITALS
HEIGHT: 67 IN | RESPIRATION RATE: 18 BRPM | DIASTOLIC BLOOD PRESSURE: 71 MMHG | WEIGHT: 126 LBS | HEART RATE: 105 BPM | SYSTOLIC BLOOD PRESSURE: 125 MMHG | BODY MASS INDEX: 19.78 KG/M2 | TEMPERATURE: 100 F | OXYGEN SATURATION: 100 %

## 2023-10-20 DIAGNOSIS — R05.9 COUGH, UNSPECIFIED TYPE: Primary | ICD-10-CM

## 2023-10-20 DIAGNOSIS — J10.1 INFLUENZA B: ICD-10-CM

## 2023-10-20 DIAGNOSIS — J02.9 SORE THROAT: ICD-10-CM

## 2023-10-20 LAB
CTP QC/QA: YES
FLUAV AG NPH QL: NEGATIVE
FLUBV AG NPH QL: POSITIVE
S PYO RRNA THROAT QL PROBE: NEGATIVE
SARS-COV-2 AG RESP QL IA.RAPID: NEGATIVE

## 2023-10-20 PROCEDURE — 87804 POCT INFLUENZA A/B: ICD-10-PCS | Mod: QW,,, | Performed by: NURSE PRACTITIONER

## 2023-10-20 PROCEDURE — 99204 OFFICE O/P NEW MOD 45 MIN: CPT | Mod: S$GLB,,, | Performed by: NURSE PRACTITIONER

## 2023-10-20 PROCEDURE — 99204 PR OFFICE/OUTPT VISIT, NEW, LEVL IV, 45-59 MIN: ICD-10-PCS | Mod: S$GLB,,, | Performed by: NURSE PRACTITIONER

## 2023-10-20 PROCEDURE — 87811 SARS-COV-2 COVID19 W/OPTIC: CPT | Mod: QW,S$GLB,, | Performed by: NURSE PRACTITIONER

## 2023-10-20 PROCEDURE — 87880 STREP A ASSAY W/OPTIC: CPT | Mod: QW,,, | Performed by: NURSE PRACTITIONER

## 2023-10-20 PROCEDURE — 87804 INFLUENZA ASSAY W/OPTIC: CPT | Mod: QW,,, | Performed by: NURSE PRACTITIONER

## 2023-10-20 PROCEDURE — 87811 SARS CORONAVIRUS 2 ANTIGEN POCT, MANUAL READ: ICD-10-PCS | Mod: QW,S$GLB,, | Performed by: NURSE PRACTITIONER

## 2023-10-20 PROCEDURE — 87880 POCT RAPID STREP A: ICD-10-PCS | Mod: QW,,, | Performed by: NURSE PRACTITIONER

## 2023-10-20 RX ORDER — AZELASTINE 1 MG/ML
1 SPRAY, METERED NASAL 2 TIMES DAILY
Qty: 30 ML | Refills: 0 | Status: SHIPPED | OUTPATIENT
Start: 2023-10-20

## 2023-10-20 RX ORDER — CETIRIZINE HYDROCHLORIDE 10 MG/1
10 TABLET ORAL DAILY
Qty: 30 TABLET | Refills: 0 | Status: SHIPPED | OUTPATIENT
Start: 2023-10-20 | End: 2023-11-19

## 2023-10-20 RX ORDER — PROMETHAZINE HYDROCHLORIDE AND DEXTROMETHORPHAN HYDROBROMIDE 6.25; 15 MG/5ML; MG/5ML
5 SYRUP ORAL EVERY 4 HOURS PRN
Qty: 118 ML | Refills: 0 | Status: SHIPPED | OUTPATIENT
Start: 2023-10-20 | End: 2023-10-30

## 2023-10-20 RX ORDER — BENZONATATE 100 MG/1
100 CAPSULE ORAL 3 TIMES DAILY PRN
Qty: 30 CAPSULE | Refills: 0 | Status: SHIPPED | OUTPATIENT
Start: 2023-10-20 | End: 2023-10-30

## 2023-10-20 RX ORDER — OSELTAMIVIR PHOSPHATE 75 MG/1
75 CAPSULE ORAL 2 TIMES DAILY
Qty: 10 CAPSULE | Refills: 0 | Status: SHIPPED | OUTPATIENT
Start: 2023-10-20 | End: 2023-10-25

## 2023-10-20 RX ORDER — FLUTICASONE PROPIONATE 50 MCG
1 SPRAY, SUSPENSION (ML) NASAL DAILY
Qty: 15.8 ML | Refills: 0 | Status: SHIPPED | OUTPATIENT
Start: 2023-10-20

## 2023-10-20 NOTE — LETTER
October 20, 2023      [facility]  [address]  [place] [address]  Phone: [phone]       Patient: Michele Perez   YOB: 2000  Date of Visit: 10/20/2023    To Whom It May Concern:    Paz Perez  was at [facility] on 10/20/2023. The patient may return to work/school on 10/23/2023 as long as symptoms have been improved and there is no fever equal to or greater than 100.4° the preceding 24 hours. If you have any questions or concerns, or if I can be of further assistance, please do not hesitate to contact me.    Sincerely,    Bijal Salcedo, NP

## 2023-10-20 NOTE — PATIENT INSTRUCTIONS
Symptomatic treatment to include:    Rest, increase fluid intake to include 50 % water, 50% electrolyte replacement  Ibuprofen/Tylenol as directed for fever, sore throat, headache, body aches.  Tylenol helps with fever but ibuprofen or aleve helps best for other symptoms.   Always take ibuprofen and or Aleve with food as repeated use can cause stomach irritation.  It is also advised to start taking Pepcid 20 mg over-the-counter twice a day for 7-10 days whenever taking NSAIDs for extended times for stomach protection  Zrytec 10 mg and flonase daily until prescriptions are completed for sinus symptoms and to reduce inflammation. Continue use until prescription finished as congestion, particularly fluid behind eardrum can linger for weeks to months.  Tessalon perles cough pills as needed day or night.  Helps best for dry, throat irritation cough.  Phenergan cough syrup at night only to help with rest.  You can take Tessalon Perles and Phenergan cough syrup together for added benefit  Mucinex D over the counter as directed for sinus congestion.   Warm, salt water gargles, over the counter throat lozenges or sprays as desires.   Liquid benadryl and maalox 1 to 1 concentration, gargle and spit for temporary relief for sore throat.  ER for difficulty breathing not relieved by rest, excessive lethargy and/or change in mental status  Return to clinic for wheezing, shortness of breath, chest tightness, vomiting for re-evaluation and possible need for additional medications for the symptoms     May return to work once symptoms are improving and no fever for 24 hours    Printed prescriptions for both Tamiflu and xofluza are provided to you to find availability and best pricing.  You will take 1 of these prescriptions not both

## 2023-10-20 NOTE — PROGRESS NOTES
"Subjective:      Patient ID: Michele Perez is a 22 y.o. male.    Vitals:  height is 5' 7" (1.702 m) and weight is 57.2 kg (126 lb). His temperature is 99.9 °F (37.7 °C). His blood pressure is 125/71 and his pulse is 105. His respiration is 18 and oxygen saturation is 100%.     Chief Complaint: Cough and Sinus Problem    Cough  This is a new problem. The current episode started in the past 7 days. The problem has been gradually worsening. The problem occurs constantly. The cough is Non-productive. Associated symptoms include chills, a fever, headaches, myalgias, nasal congestion, postnasal drip, a sore throat and sweats. Pertinent negatives include no ear pain, rash, shortness of breath or wheezing. Nothing aggravates the symptoms. He has tried OTC cough suppressant for the symptoms.   Sinus Problem  This is a new problem. The current episode started in the past 7 days. The problem has been gradually worsening since onset. There has been no fever. He is experiencing no pain. Associated symptoms include chills, congestion, coughing, diaphoresis, headaches and a sore throat. Pertinent negatives include no ear pain or shortness of breath. Past treatments include acetaminophen.       Constitution: Positive for appetite change, chills, sweating, fatigue and fever.   HENT:  Positive for congestion, postnasal drip and sore throat. Negative for ear pain and ear discharge.    Respiratory:  Positive for chest tightness and cough. Negative for shortness of breath, wheezing and asthma.    Gastrointestinal:  Negative for nausea, vomiting and diarrhea.   Musculoskeletal:  Positive for muscle ache.   Skin:  Negative for rash.   Allergic/Immunologic: Negative for asthma.   Neurological:  Positive for headaches.      Objective:     Physical Exam   Constitutional: He is oriented to person, place, and time. He is cooperative.  Non-toxic appearance. He does not appear ill. No distress. awake  HENT:   Head: Normocephalic and " atraumatic.   Nose: Rhinorrhea and congestion present.   Mouth/Throat: Mucous membranes are moist. Posterior oropharyngeal erythema present. No oropharyngeal exudate.   Bilateral ear canals occluded with soft waxy materials unable to visualize TMs.      Comments: Bilateral ear canals occluded with soft waxy materials unable to visualize TMs.  Eyes: Conjunctivae are normal. Right eye exhibits no discharge. Left eye exhibits no discharge.   Neck: Neck supple. No neck rigidity present.   Cardiovascular: Normal rate, regular rhythm and normal heart sounds.   Pulmonary/Chest: Effort normal and breath sounds normal. No accessory muscle usage. No tachypnea. No respiratory distress. He has no wheezes. He has no rhonchi. He has no rales. He exhibits no tenderness.   Abdominal: Normal appearance.   Musculoskeletal:      Cervical back: He exhibits no tenderness.   Lymphadenopathy:     He has no cervical adenopathy.   Neurological: no focal deficit. He is alert and oriented to person, place, and time. No sensory deficit.   Skin: Skin is warm, dry, not diaphoretic and no rash. Capillary refill takes 2 to 3 seconds.   Psychiatric: His behavior is normal. Mood normal.   Nursing note and vitals reviewed.      Assessment:     1. Cough, unspecified type    2. Sore throat    3. Influenza B      COVID negative  Flu a negative  Flu/B positive  Strep A negative      Plan:       Cough, unspecified type  -     SARS Coronavirus 2 Antigen, POCT Manual Read  -     POCT Influenza A/B Rapid Antigen    Sore throat  -     POCT rapid strep A    Influenza B  -     azelastine (ASTELIN) 137 mcg (0.1 %) nasal spray; 1 spray (137 mcg total) by Nasal route 2 (two) times daily.  Dispense: 30 mL; Refill: 0  -     fluticasone propionate (FLONASE) 50 mcg/actuation nasal spray; 1 spray (50 mcg total) by Each Nostril route once daily.  Dispense: 15.8 mL; Refill: 0  -     baloxavir marboxiL (XOFLUZA) 40 mg tablet; Take 1 tablet (40 mg total) by mouth once. for  1 dose  Dispense: 1 tablet; Refill: 0  -     cetirizine (ZYRTEC) 10 MG tablet; Take 1 tablet (10 mg total) by mouth once daily.  Dispense: 30 tablet; Refill: 0  -     benzonatate (TESSALON) 100 MG capsule; Take 1 capsule (100 mg total) by mouth 3 (three) times daily as needed for Cough.  Dispense: 30 capsule; Refill: 0  -     promethazine-dextromethorphan (PROMETHAZINE-DM) 6.25-15 mg/5 mL Syrp; Take 5 mLs by mouth every 4 (four) hours as needed (cough).  Dispense: 118 mL; Refill: 0  -     oseltamivir (TAMIFLU) 75 MG capsule; Take 1 capsule (75 mg total) by mouth 2 (two) times daily. for 5 days  Dispense: 10 capsule; Refill: 0        Symptomatic treatment to include:    Rest, increase fluid intake to include 50 % water, 50% electrolyte replacement  Ibuprofen/Tylenol as directed for fever, sore throat, headache, body aches.  Tylenol helps with fever but ibuprofen or aleve helps best for other symptoms.   Always take ibuprofen and or Aleve with food as repeated use can cause stomach irritation.  It is also advised to start taking Pepcid 20 mg over-the-counter twice a day for 7-10 days whenever taking NSAIDs for extended times for stomach protection  Zrytec 10 mg and flonase daily until prescriptions are completed for sinus symptoms and to reduce inflammation. Continue use until prescription finished as congestion, particularly fluid behind eardrum can linger for weeks to months.  Tessalon perles cough pills as needed day or night.  Helps best for dry, throat irritation cough.  Phenergan cough syrup at night only to help with rest.  You can take Tessalon Perles and Phenergan cough syrup together for added benefit  Mucinex D over the counter as directed for sinus congestion.   Warm, salt water gargles, over the counter throat lozenges or sprays as desires.   Liquid benadryl and maalox 1 to 1 concentration, gargle and spit for temporary relief for sore throat.  ER for difficulty breathing not relieved by rest, excessive  lethargy and/or change in mental status  Return to clinic for wheezing, shortness of breath, chest tightness, vomiting for re-evaluation and possible need for additional medications for the symptoms     May return to work once symptoms are improving and no fever for 24 hours    Printed prescriptions for both Tamiflu and xofluza are provided to you to find availability and best pricing.  You will take 1 of these prescriptions not both

## 2023-12-28 ENCOUNTER — OFFICE VISIT (OUTPATIENT)
Dept: UROLOGY | Facility: CLINIC | Age: 23
End: 2023-12-28
Payer: COMMERCIAL

## 2023-12-28 VITALS — BODY MASS INDEX: 19.8 KG/M2 | WEIGHT: 126.13 LBS | HEIGHT: 67 IN | RESPIRATION RATE: 15 BRPM

## 2023-12-28 DIAGNOSIS — R30.0 DYSURIA: Primary | ICD-10-CM

## 2023-12-28 DIAGNOSIS — R36.9 PENILE DISCHARGE: ICD-10-CM

## 2023-12-28 LAB
BACTERIA #/AREA URNS AUTO: ABNORMAL /HPF
MICROSCOPIC COMMENT: ABNORMAL
RBC #/AREA URNS AUTO: 1 /HPF (ref 0–4)
WBC #/AREA URNS AUTO: 76 /HPF (ref 0–5)

## 2023-12-28 PROCEDURE — 99214 PR OFFICE/OUTPT VISIT, EST, LEVL IV, 30-39 MIN: ICD-10-PCS | Mod: S$GLB,,, | Performed by: NURSE PRACTITIONER

## 2023-12-28 PROCEDURE — 81001 URINALYSIS AUTO W/SCOPE: CPT | Performed by: NURSE PRACTITIONER

## 2023-12-28 PROCEDURE — 99999 PR PBB SHADOW E&M-EST. PATIENT-LVL III: CPT | Mod: PBBFAC,,, | Performed by: NURSE PRACTITIONER

## 2023-12-28 PROCEDURE — 99999 PR PBB SHADOW E&M-EST. PATIENT-LVL III: ICD-10-PCS | Mod: PBBFAC,,, | Performed by: NURSE PRACTITIONER

## 2023-12-28 PROCEDURE — 87086 URINE CULTURE/COLONY COUNT: CPT | Performed by: NURSE PRACTITIONER

## 2023-12-28 PROCEDURE — 87798 DETECT AGENT NOS DNA AMP: CPT | Mod: 59 | Performed by: NURSE PRACTITIONER

## 2023-12-28 PROCEDURE — 99214 OFFICE O/P EST MOD 30 MIN: CPT | Mod: S$GLB,,, | Performed by: NURSE PRACTITIONER

## 2023-12-28 RX ORDER — DOXYCYCLINE 100 MG/1
100 CAPSULE ORAL 2 TIMES DAILY
Qty: 28 CAPSULE | Refills: 0 | Status: SHIPPED | OUTPATIENT
Start: 2023-12-28 | End: 2024-01-11

## 2023-12-28 RX ORDER — PHENAZOPYRIDINE HYDROCHLORIDE 100 MG/1
100 TABLET, FILM COATED ORAL 2 TIMES DAILY PRN
Qty: 20 TABLET | Refills: 0 | Status: SHIPPED | OUTPATIENT
Start: 2023-12-28 | End: 2024-01-07

## 2023-12-28 NOTE — PROGRESS NOTES
CHIEF COMPLAINT:    Mr. Perez is a 23 y.o. male presenting for f/u penile discharge.  PRESENTING ILLNESS:    Michele Perez is a 23 y.o. male who presents for f/u penile discharge. Last clinic visit was 3/27/23    11/30/22  Pt was last evaluated by me on 10/24/22 for UTI symptoms and Dysuria.  At that time he was treated for possible prostatitis.  Today, pt RTC c/o symptoms have returned, less ejaculation of semen observed with discoloration at this time.  Pt does admit during ov today that he continued having intercourse while on antibiotics after last ov.   Exam:  The pt was last evaluated by Dr. Gustafson in 2019 for hx of recurrent UTIs and dysuria symptoms.   Several years ago, pt presented with approximately one-month   history of intermittent episodes of hematuria and dysuria and at that time, he   was placed on doxycycline 100 mg p.o. b.i.d., which resolved the problem, but   The symptoms then returned.   Currently, he is not on any antibiotics and   denies any fever, chills, no any other complaints.  However pt has been taking OTC AZO for his current LUTS.   Family Hx of  Cancers?:  Unknown  Current Tobacco use?:  None   Pt denies gross hematuria   Repeat regimen of Doxy 100 mg BID x 30 days prescribed to this pt during ov today.  Pt was advised NO INTERCOURSE until pt and partner are evaluated and completes treatment with antibiotics.  If not, infection will never dissipate.     3/20/23  Patient presents to clinic today for mild dysuria and white penile discharge for the past 2-3 days. No odor to discharge. Denies gross hematuria, flank pain, fever, chills, nausea or vomiting. Strong urinary stream. Denies testicle pain or scrotal swelling. Denies urinary frequency, urgency or leakage. Denies intermittent stream or hesitancy.   Denies any new partners or unprotected intercourse since last clinic visit. He is not concerned for STI's.    3/27/23  Patient presents to clinic for f/u dysuria and  penile discharge. He is currently taking doxycycline x 1 week. He reports dysuria and penile discharge have resolved. No complaints today in clinic.    3/20/23   Micro UA RBC 80, WBC 20, few bacteria  Culture: Multiple organisms isolated. None in predominance    12/28/23  Pt presents today for dysuria and penile discharge x 1 week.  He reports discharge is grayish color, odorless. No blood.  Denies testicle pain or scrotal swelling.   Denies gross hematuria, flank pain, fever, chills, nausea or vomiting  Strong urinary stream. Denies intermittent stream, hesitancy or split stream  Denies urinary frequency or urgency    Pt denies any unprotected sexual intercourse or new partners. Not concerned for STI    Urine cultures:   Lab Results   Component Value Date    LABURIN  03/20/2023     Multiple organisms isolated. None in predominance.  Repeat if    LABURIN clinically necessary. 03/20/2023    LABURIN No growth 10/24/2022    LABURIN  06/27/2019     Multiple organisms isolated. None in predominance.  Repeat if    LABURIN clinically necessary. 06/27/2019    LABURIN  04/05/2019     COAGULASE-NEGATIVE STAPHYLOCOCCUS SPECIES  10,000 - 49,999 cfu/ml  Susceptibility testing not routinely performed.  No other significant isolate           REVIEW OF SYSTEMS:    Review of Systems    Constitutional: Negative for fever and chills.   Gastrointestinal: Negative for nausea, vomiting.   Genitourinary: See above  Neurological: Negative for dizziness.   Psychiatric/Behavioral: Negative for confusion.       PATIENT HISTORY:    Past Medical History:   Diagnosis Date    ADHD (attention deficit hyperactivity disorder)     no current medications    Allergy     seasonal rhinitis    Asthma     primarily exercise-induced asthma    Learning disability     speech related       Past Surgical History:   Procedure Laterality Date    ADENOIDECTOMY  2002    CYSTOSCOPY N/A 8/5/2019    Procedure: CYSTOSCOPY;  Surgeon: Earl Gustafson MD;  Location: [facility]  "OR;  Service: Urology;  Laterality: N/A;    CYSTOSCOPY WITH BIOPSY OF BLADDER N/A 8/5/2019    Procedure: CYSTOSCOPY, WITH BLADDER BIOPSY, WITH FULGURATION IF INDICATED;  Surgeon: Earl Gustafson MD;  Location: [facility] OR;  Service: Urology;  Laterality: N/A;    TONSILLECTOMY  2002    TYMPANOSTOMY TUBE PLACEMENT  2002       PHYSICAL EXAMINATION:    Constitutional: He is oriented to person, place, and time. He appears well-developed and well-nourished.  He is in no apparent distress.    Abdominal:  He exhibits no distension.  There is no CVA tenderness.     Neurological: He is alert and oriented to person, place, and time.     Psych: Cooperative with normal affect.    Physical Exam      LABS:    No results found for: "PSA", "PSADIAG", "PSATOTAL", "PSAFREE", "PSAFREEPCT"  Lab Results   Component Value Date    CREATININE 0.8 08/01/2019         IMPRESSION:    Encounter Diagnoses   Name Primary?    Dysuria Yes    Penile discharge        PLAN:  -Recommend G/C testing but pt declined.  -Urine sent for micro UA, culture and ureaplasma. Will call with results.  Start doxycycline.   Discussed side effects and indications for doxycycline. Prescription sent to the pharmacy. Pt verbalized understanding. NKA  Discussed side effects and indications for pyridium. Prescription sent to the pharmacy. Pt verbalized understanding. Can take as needed for dysuria. Pt aware urine will turn orange color.    -Good water intake to maintain proper hydration    -Will obtain imaging based on urine results    -RTC based on results      I encouraged him or any of his family members to call or email me with questions and/or concerns.      30 minutes of total time spent on the encounter, which includes face to face time and non-face to face time preparing to see the patient (eg, review of tests), Obtaining and/or reviewing separately obtained history, Documenting clinical information in the electronic or other health record, Independently interpreting " results (not separately reported) and communicating results to the patient/family/caregiver, or Care coordination (not separately reported).

## 2023-12-29 LAB — BACTERIA UR CULT: NO GROWTH

## 2024-01-03 LAB
SPECIMEN SOURCE: NORMAL
U PARVUM DNA SPEC QL NAA+PROBE: NEGATIVE
U UREALYTICUM DNA SPEC QL NAA+PROBE: NEGATIVE

## 2024-01-04 DIAGNOSIS — R30.0 DYSURIA: Primary | ICD-10-CM

## (undated) DEVICE — SYR ONLY LUER LOCK 20CC

## (undated) DEVICE — SEE MEDLINE ITEM 157117

## (undated) DEVICE — LUBRICANT SURGILUBE 2 OZ

## (undated) DEVICE — SPONGE SUPER KERLIX 6X6.75IN

## (undated) DEVICE — SOL IRR WATER STRL 3000 ML

## (undated) DEVICE — SCRUB HIBICLENS 4% CHG 4OZ

## (undated) DEVICE — Device

## (undated) DEVICE — SEE MEDLINE ITEM 152487

## (undated) DEVICE — SLEEVE SCD EXPRESS CALF MEDIUM

## (undated) DEVICE — SOL WATER STRL IRR 1000ML

## (undated) DEVICE — GLOVE SURG ULTRA TOUCH 7.5

## (undated) DEVICE — SET IRR URLGY 2LINE UNIV SPIKE

## (undated) DEVICE — ELECTRODE REM PLYHSV RETURN 9

## (undated) DEVICE — SEE MEDLINE ITEM 157185